# Patient Record
Sex: FEMALE | Race: WHITE | Employment: OTHER | URBAN - METROPOLITAN AREA
[De-identification: names, ages, dates, MRNs, and addresses within clinical notes are randomized per-mention and may not be internally consistent; named-entity substitution may affect disease eponyms.]

---

## 2019-09-05 ENCOUNTER — APPOINTMENT (OUTPATIENT)
Dept: GENERAL RADIOLOGY | Age: 84
End: 2019-09-05
Payer: MEDICARE

## 2019-09-05 ENCOUNTER — HOSPITAL ENCOUNTER (EMERGENCY)
Age: 84
Discharge: HOME | End: 2019-09-06
Attending: EMERGENCY MEDICINE
Payer: MEDICARE

## 2019-09-05 ENCOUNTER — APPOINTMENT (OUTPATIENT)
Dept: CT IMAGING | Age: 84
End: 2019-09-05
Payer: MEDICARE
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
## 2019-09-05 DIAGNOSIS — D72.829 LEUKOCYTOSIS, UNSPECIFIED TYPE: ICD-10-CM

## 2019-09-05 PROCEDURE — 71045 X-RAY EXAM CHEST 1 VIEW: CPT

## 2019-09-05 PROCEDURE — 70450 CT HEAD/BRAIN W/O DYE: CPT

## 2019-09-05 PROCEDURE — 99285 EMERGENCY DEPT VISIT HI MDM: CPT

## 2019-09-05 RX ORDER — RISPERIDONE 1 MG/1
1 TABLET, FILM COATED ORAL 2 TIMES DAILY PRN
Refills: 3 | COMMUNITY

## 2019-09-05 RX ORDER — SODIUM CHLORIDE 0.9 % (FLUSH) 0.9 %
10 SYRINGE (ML) INJECTION PRN
Status: DISCONTINUED | OUTPATIENT
Start: 2019-09-05 | End: 2019-09-06 | Stop reason: HOSPADM

## 2019-09-05 RX ORDER — TRIAMTERENE AND HYDROCHLOROTHIAZIDE 37.5; 25 MG/1; MG/1
1 TABLET ORAL DAILY PRN
COMMUNITY
End: 2020-03-24

## 2019-09-06 VITALS
HEART RATE: 64 BPM | SYSTOLIC BLOOD PRESSURE: 107 MMHG | DIASTOLIC BLOOD PRESSURE: 57 MMHG | HEIGHT: 61 IN | OXYGEN SATURATION: 95 % | RESPIRATION RATE: 16 BRPM | TEMPERATURE: 97.4 F | WEIGHT: 190 LBS | BODY MASS INDEX: 35.87 KG/M2

## 2019-09-06 LAB
ANION GAP SERPL CALCULATED.3IONS-SCNC: 11 MMOL/L (ref 7–16)
BACTERIA: NORMAL /HPF
BASOPHILS ABSOLUTE: 0.04 E9/L (ref 0–0.2)
BASOPHILS RELATIVE PERCENT: 0.3 % (ref 0–2)
BILIRUBIN URINE: NEGATIVE
BLOOD, URINE: NORMAL
BUN BLDV-MCNC: 17 MG/DL (ref 8–23)
CALCIUM SERPL-MCNC: 9 MG/DL (ref 8.6–10.2)
CHLORIDE BLD-SCNC: 98 MMOL/L (ref 98–107)
CLARITY: CLEAR
CO2: 27 MMOL/L (ref 22–29)
COLOR: YELLOW
CREAT SERPL-MCNC: 1 MG/DL (ref 0.5–1)
EKG ATRIAL RATE: 63 BPM
EKG P AXIS: 65 DEGREES
EKG P-R INTERVAL: 226 MS
EKG Q-T INTERVAL: 452 MS
EKG QRS DURATION: 88 MS
EKG QTC CALCULATION (BAZETT): 462 MS
EKG R AXIS: -5 DEGREES
EKG T AXIS: 72 DEGREES
EKG VENTRICULAR RATE: 63 BPM
EOSINOPHILS ABSOLUTE: 0.02 E9/L (ref 0.05–0.5)
EOSINOPHILS RELATIVE PERCENT: 0.1 % (ref 0–6)
EPITHELIAL CELLS, UA: NORMAL /HPF
GFR AFRICAN AMERICAN: >60
GFR NON-AFRICAN AMERICAN: 52 ML/MIN/1.73
GLUCOSE BLD-MCNC: 129 MG/DL (ref 74–99)
GLUCOSE URINE: NEGATIVE MG/DL
HCT VFR BLD CALC: 36.3 % (ref 34–48)
HEMOGLOBIN: 12 G/DL (ref 11.5–15.5)
IMMATURE GRANULOCYTES #: 0.08 E9/L
IMMATURE GRANULOCYTES %: 0.6 % (ref 0–5)
KETONES, URINE: NEGATIVE MG/DL
LEUKOCYTE ESTERASE, URINE: NEGATIVE
LYMPHOCYTES ABSOLUTE: 0.93 E9/L (ref 1.5–4)
LYMPHOCYTES RELATIVE PERCENT: 6.6 % (ref 20–42)
MCH RBC QN AUTO: 31.5 PG (ref 26–35)
MCHC RBC AUTO-ENTMCNC: 33.1 % (ref 32–34.5)
MCV RBC AUTO: 95.3 FL (ref 80–99.9)
MONOCYTES ABSOLUTE: 1.05 E9/L (ref 0.1–0.95)
MONOCYTES RELATIVE PERCENT: 7.5 % (ref 2–12)
NEUTROPHILS ABSOLUTE: 11.89 E9/L (ref 1.8–7.3)
NEUTROPHILS RELATIVE PERCENT: 84.9 % (ref 43–80)
NITRITE, URINE: NEGATIVE
PDW BLD-RTO: 12.7 FL (ref 11.5–15)
PH UA: 7 (ref 5–9)
PLATELET # BLD: 221 E9/L (ref 130–450)
PMV BLD AUTO: 10.1 FL (ref 7–12)
POTASSIUM SERPL-SCNC: 4 MMOL/L (ref 3.5–5)
PRO-BNP: 975 PG/ML (ref 0–450)
PROTEIN UA: NEGATIVE MG/DL
RBC # BLD: 3.81 E12/L (ref 3.5–5.5)
RBC UA: NORMAL /HPF (ref 0–2)
SODIUM BLD-SCNC: 136 MMOL/L (ref 132–146)
SPECIFIC GRAVITY UA: 1.01 (ref 1–1.03)
TROPONIN: <0.01 NG/ML (ref 0–0.03)
UROBILINOGEN, URINE: 0.2 E.U./DL
WBC # BLD: 14 E9/L (ref 4.5–11.5)
WBC UA: NORMAL /HPF (ref 0–5)

## 2019-09-06 PROCEDURE — 81001 URINALYSIS AUTO W/SCOPE: CPT

## 2019-09-06 PROCEDURE — 83880 ASSAY OF NATRIURETIC PEPTIDE: CPT

## 2019-09-06 PROCEDURE — 84484 ASSAY OF TROPONIN QUANT: CPT

## 2019-09-06 PROCEDURE — 87088 URINE BACTERIA CULTURE: CPT

## 2019-09-06 PROCEDURE — 93010 ELECTROCARDIOGRAM REPORT: CPT | Performed by: INTERNAL MEDICINE

## 2019-09-06 PROCEDURE — 93005 ELECTROCARDIOGRAM TRACING: CPT | Performed by: EMERGENCY MEDICINE

## 2019-09-06 PROCEDURE — 85025 COMPLETE CBC W/AUTO DIFF WBC: CPT

## 2019-09-06 PROCEDURE — 80048 BASIC METABOLIC PNL TOTAL CA: CPT

## 2019-09-08 LAB — URINE CULTURE, ROUTINE: NORMAL

## 2020-03-24 ENCOUNTER — APPOINTMENT (OUTPATIENT)
Dept: CT IMAGING | Age: 85
DRG: 175 | End: 2020-03-24
Payer: MEDICARE

## 2020-03-24 ENCOUNTER — APPOINTMENT (OUTPATIENT)
Dept: GENERAL RADIOLOGY | Age: 85
DRG: 175 | End: 2020-03-24
Payer: MEDICARE

## 2020-03-24 ENCOUNTER — APPOINTMENT (OUTPATIENT)
Dept: ULTRASOUND IMAGING | Age: 85
DRG: 175 | End: 2020-03-24
Payer: MEDICARE

## 2020-03-24 ENCOUNTER — HOSPITAL ENCOUNTER (INPATIENT)
Age: 85
LOS: 2 days | Discharge: HOME | DRG: 175 | End: 2020-03-26
Attending: EMERGENCY MEDICINE | Admitting: INTERNAL MEDICINE
Payer: MEDICARE

## 2020-03-24 PROBLEM — I26.99 PULMONARY EMBOLISM, BILATERAL (HCC): Status: ACTIVE | Noted: 2020-03-24

## 2020-03-24 PROBLEM — I65.21 RIGHT CAROTID ARTERY OCCLUSION: Status: ACTIVE | Noted: 2020-03-24

## 2020-03-24 PROBLEM — F03.90 DEMENTIA (HCC): Chronic | Status: ACTIVE | Noted: 2020-03-24

## 2020-03-24 PROBLEM — I65.22 LEFT CAROTID STENOSIS: Status: ACTIVE | Noted: 2020-03-24

## 2020-03-24 PROBLEM — I26.02 ACUTE SADDLE PULMONARY EMBOLISM WITH ACUTE COR PULMONALE (HCC): Status: ACTIVE | Noted: 2020-03-24

## 2020-03-24 PROBLEM — I10 HYPERTENSION: Chronic | Status: ACTIVE | Noted: 2020-03-24

## 2020-03-24 LAB
ABO/RH: NORMAL
ALBUMIN SERPL-MCNC: 3.7 G/DL (ref 3.5–5.2)
ALP BLD-CCNC: 77 U/L (ref 35–104)
ALT SERPL-CCNC: 16 U/L (ref 0–32)
ANION GAP SERPL CALCULATED.3IONS-SCNC: 16 MMOL/L (ref 7–16)
ANTIBODY SCREEN: NORMAL
APTT: 152.9 SEC (ref 24.5–35.1)
APTT: 27.1 SEC (ref 24.5–35.1)
APTT: >240 SEC (ref 24.5–35.1)
AST SERPL-CCNC: 28 U/L (ref 0–31)
BACTERIA: NORMAL /HPF
BASOPHILS ABSOLUTE: 0.04 E9/L (ref 0–0.2)
BASOPHILS RELATIVE PERCENT: 0.3 % (ref 0–2)
BILIRUB SERPL-MCNC: 0.4 MG/DL (ref 0–1.2)
BILIRUBIN URINE: NEGATIVE
BLOOD, URINE: ABNORMAL
BUN BLDV-MCNC: 18 MG/DL (ref 8–23)
CALCIUM SERPL-MCNC: 9.2 MG/DL (ref 8.6–10.2)
CHLORIDE BLD-SCNC: 97 MMOL/L (ref 98–107)
CHP ED QC CHECK: NORMAL
CLARITY: CLEAR
CO2: 26 MMOL/L (ref 22–29)
COLOR: YELLOW
CREAT SERPL-MCNC: 0.8 MG/DL (ref 0.5–1)
EKG ATRIAL RATE: 147 BPM
EKG Q-T INTERVAL: 378 MS
EKG QRS DURATION: 86 MS
EKG QTC CALCULATION (BAZETT): 509 MS
EKG R AXIS: 40 DEGREES
EKG T AXIS: 20 DEGREES
EKG VENTRICULAR RATE: 109 BPM
EOSINOPHILS ABSOLUTE: 0.26 E9/L (ref 0.05–0.5)
EOSINOPHILS RELATIVE PERCENT: 2.2 % (ref 0–6)
EPITHELIAL CELLS, UA: NORMAL /HPF
FIBRINOGEN: 508 MG/DL (ref 225–540)
GFR AFRICAN AMERICAN: >60
GFR NON-AFRICAN AMERICAN: >60 ML/MIN/1.73
GLUCOSE BLD-MCNC: 152 MG/DL
GLUCOSE BLD-MCNC: 164 MG/DL (ref 74–99)
GLUCOSE URINE: NEGATIVE MG/DL
HCT VFR BLD CALC: 42.9 % (ref 34–48)
HEMOGLOBIN: 13.9 G/DL (ref 11.5–15.5)
IMMATURE GRANULOCYTES #: 0.1 E9/L
IMMATURE GRANULOCYTES %: 0.8 % (ref 0–5)
INR BLD: 1
KETONES, URINE: NEGATIVE MG/DL
LACTIC ACID, SEPSIS: 2.7 MMOL/L (ref 0.5–1.9)
LACTIC ACID, SEPSIS: 3 MMOL/L (ref 0.5–1.9)
LACTIC ACID: 1.4 MMOL/L (ref 0.5–2.2)
LEUKOCYTE ESTERASE, URINE: ABNORMAL
LYMPHOCYTES ABSOLUTE: 2.76 E9/L (ref 1.5–4)
LYMPHOCYTES RELATIVE PERCENT: 23.1 % (ref 20–42)
MCH RBC QN AUTO: 31.2 PG (ref 26–35)
MCHC RBC AUTO-ENTMCNC: 32.4 % (ref 32–34.5)
MCV RBC AUTO: 96.2 FL (ref 80–99.9)
METER GLUCOSE: 152 MG/DL (ref 74–99)
MONOCYTES ABSOLUTE: 0.66 E9/L (ref 0.1–0.95)
MONOCYTES RELATIVE PERCENT: 5.5 % (ref 2–12)
NEUTROPHILS ABSOLUTE: 8.14 E9/L (ref 1.8–7.3)
NEUTROPHILS RELATIVE PERCENT: 68.1 % (ref 43–80)
NITRITE, URINE: NEGATIVE
PDW BLD-RTO: 12.5 FL (ref 11.5–15)
PH UA: 7.5 (ref 5–9)
PLATELET # BLD: 260 E9/L (ref 130–450)
PMV BLD AUTO: 9.6 FL (ref 7–12)
POTASSIUM SERPL-SCNC: 4 MMOL/L (ref 3.5–5)
PRO-BNP: 522 PG/ML (ref 0–450)
PROTEIN UA: NEGATIVE MG/DL
PROTHROMBIN TIME: 11.8 SEC (ref 9.3–12.4)
RBC # BLD: 4.46 E12/L (ref 3.5–5.5)
RBC UA: NORMAL /HPF (ref 0–2)
SODIUM BLD-SCNC: 139 MMOL/L (ref 132–146)
SPECIFIC GRAVITY UA: 1.01 (ref 1–1.03)
TOTAL PROTEIN: 6.9 G/DL (ref 6.4–8.3)
TROPONIN: 0.01 NG/ML (ref 0–0.03)
UROBILINOGEN, URINE: 0.2 E.U./DL
WBC # BLD: 12 E9/L (ref 4.5–11.5)
WBC UA: NORMAL /HPF (ref 0–5)

## 2020-03-24 PROCEDURE — 99223 1ST HOSP IP/OBS HIGH 75: CPT | Performed by: SURGERY

## 2020-03-24 PROCEDURE — 93308 TTE F-UP OR LMTD: CPT

## 2020-03-24 PROCEDURE — 2000000000 HC ICU R&B

## 2020-03-24 PROCEDURE — 2580000003 HC RX 258: Performed by: STUDENT IN AN ORGANIZED HEALTH CARE EDUCATION/TRAINING PROGRAM

## 2020-03-24 PROCEDURE — 6370000000 HC RX 637 (ALT 250 FOR IP): Performed by: NURSE PRACTITIONER

## 2020-03-24 PROCEDURE — 93010 ELECTROCARDIOGRAM REPORT: CPT | Performed by: INTERNAL MEDICINE

## 2020-03-24 PROCEDURE — 36415 COLL VENOUS BLD VENIPUNCTURE: CPT

## 2020-03-24 PROCEDURE — 93970 EXTREMITY STUDY: CPT

## 2020-03-24 PROCEDURE — 99221 1ST HOSP IP/OBS SF/LOW 40: CPT | Performed by: NURSE PRACTITIONER

## 2020-03-24 PROCEDURE — 85384 FIBRINOGEN ACTIVITY: CPT

## 2020-03-24 PROCEDURE — 2580000003 HC RX 258: Performed by: NURSE PRACTITIONER

## 2020-03-24 PROCEDURE — 84484 ASSAY OF TROPONIN QUANT: CPT

## 2020-03-24 PROCEDURE — 70450 CT HEAD/BRAIN W/O DYE: CPT

## 2020-03-24 PROCEDURE — 85025 COMPLETE CBC W/AUTO DIFF WBC: CPT

## 2020-03-24 PROCEDURE — 0042T CT BRAIN PERFUSION: CPT

## 2020-03-24 PROCEDURE — 81001 URINALYSIS AUTO W/SCOPE: CPT

## 2020-03-24 PROCEDURE — 87040 BLOOD CULTURE FOR BACTERIA: CPT

## 2020-03-24 PROCEDURE — 86900 BLOOD TYPING SEROLOGIC ABO: CPT

## 2020-03-24 PROCEDURE — 86901 BLOOD TYPING SEROLOGIC RH(D): CPT

## 2020-03-24 PROCEDURE — 83605 ASSAY OF LACTIC ACID: CPT

## 2020-03-24 PROCEDURE — 99285 EMERGENCY DEPT VISIT HI MDM: CPT

## 2020-03-24 PROCEDURE — 70498 CT ANGIOGRAPHY NECK: CPT

## 2020-03-24 PROCEDURE — 85610 PROTHROMBIN TIME: CPT

## 2020-03-24 PROCEDURE — 72125 CT NECK SPINE W/O DYE: CPT

## 2020-03-24 PROCEDURE — 85730 THROMBOPLASTIN TIME PARTIAL: CPT

## 2020-03-24 PROCEDURE — 93005 ELECTROCARDIOGRAM TRACING: CPT | Performed by: STUDENT IN AN ORGANIZED HEALTH CARE EDUCATION/TRAINING PROGRAM

## 2020-03-24 PROCEDURE — APPSS180 APP SPLIT SHARED TIME > 60 MINUTES: Performed by: NURSE PRACTITIONER

## 2020-03-24 PROCEDURE — 71275 CT ANGIOGRAPHY CHEST: CPT

## 2020-03-24 PROCEDURE — 71045 X-RAY EXAM CHEST 1 VIEW: CPT

## 2020-03-24 PROCEDURE — 73100 X-RAY EXAM OF WRIST: CPT

## 2020-03-24 PROCEDURE — 99291 CRITICAL CARE FIRST HOUR: CPT | Performed by: SURGERY

## 2020-03-24 PROCEDURE — 6370000000 HC RX 637 (ALT 250 FOR IP): Performed by: INTERNAL MEDICINE

## 2020-03-24 PROCEDURE — 86850 RBC ANTIBODY SCREEN: CPT

## 2020-03-24 PROCEDURE — 83880 ASSAY OF NATRIURETIC PEPTIDE: CPT

## 2020-03-24 PROCEDURE — 6360000004 HC RX CONTRAST MEDICATION: Performed by: RADIOLOGY

## 2020-03-24 PROCEDURE — 6360000002 HC RX W HCPCS: Performed by: STUDENT IN AN ORGANIZED HEALTH CARE EDUCATION/TRAINING PROGRAM

## 2020-03-24 PROCEDURE — 70496 CT ANGIOGRAPHY HEAD: CPT

## 2020-03-24 PROCEDURE — 80053 COMPREHEN METABOLIC PANEL: CPT

## 2020-03-24 PROCEDURE — 82962 GLUCOSE BLOOD TEST: CPT

## 2020-03-24 RX ORDER — ACETAMINOPHEN 650 MG/1
650 SUPPOSITORY RECTAL EVERY 6 HOURS PRN
Status: DISCONTINUED | OUTPATIENT
Start: 2020-03-24 | End: 2020-03-26 | Stop reason: HOSPADM

## 2020-03-24 RX ORDER — SODIUM CHLORIDE 0.9 % (FLUSH) 0.9 %
10 SYRINGE (ML) INJECTION EVERY 12 HOURS SCHEDULED
Status: DISCONTINUED | OUTPATIENT
Start: 2020-03-24 | End: 2020-03-24 | Stop reason: SDUPTHER

## 2020-03-24 RX ORDER — HEPARIN SODIUM 1000 [USP'U]/ML
40 INJECTION, SOLUTION INTRAVENOUS; SUBCUTANEOUS PRN
Status: DISCONTINUED | OUTPATIENT
Start: 2020-03-24 | End: 2020-03-25

## 2020-03-24 RX ORDER — 0.9 % SODIUM CHLORIDE 0.9 %
1000 INTRAVENOUS SOLUTION INTRAVENOUS ONCE
Status: COMPLETED | OUTPATIENT
Start: 2020-03-24 | End: 2020-03-24

## 2020-03-24 RX ORDER — HEPARIN SODIUM 1000 [USP'U]/ML
80 INJECTION, SOLUTION INTRAVENOUS; SUBCUTANEOUS ONCE
Status: DISCONTINUED | OUTPATIENT
Start: 2020-03-24 | End: 2020-03-25

## 2020-03-24 RX ORDER — PROMETHAZINE HYDROCHLORIDE 25 MG/1
12.5 TABLET ORAL EVERY 6 HOURS PRN
Status: DISCONTINUED | OUTPATIENT
Start: 2020-03-24 | End: 2020-03-26 | Stop reason: HOSPADM

## 2020-03-24 RX ORDER — ACETAMINOPHEN 325 MG/1
650 TABLET ORAL EVERY 6 HOURS PRN
Status: DISCONTINUED | OUTPATIENT
Start: 2020-03-24 | End: 2020-03-26 | Stop reason: HOSPADM

## 2020-03-24 RX ORDER — METOPROLOL TARTRATE 50 MG/1
50 TABLET, FILM COATED ORAL 2 TIMES DAILY
Status: DISCONTINUED | OUTPATIENT
Start: 2020-03-24 | End: 2020-03-26 | Stop reason: HOSPADM

## 2020-03-24 RX ORDER — HEPARIN SODIUM 10000 [USP'U]/100ML
18 INJECTION, SOLUTION INTRAVENOUS CONTINUOUS
Status: DISCONTINUED | OUTPATIENT
Start: 2020-03-24 | End: 2020-03-25

## 2020-03-24 RX ORDER — ONDANSETRON 2 MG/ML
4 INJECTION INTRAMUSCULAR; INTRAVENOUS EVERY 6 HOURS PRN
Status: DISCONTINUED | OUTPATIENT
Start: 2020-03-24 | End: 2020-03-25

## 2020-03-24 RX ORDER — ATORVASTATIN CALCIUM 40 MG/1
40 TABLET, FILM COATED ORAL DAILY
Status: DISCONTINUED | OUTPATIENT
Start: 2020-03-24 | End: 2020-03-26 | Stop reason: HOSPADM

## 2020-03-24 RX ORDER — RISPERIDONE 1 MG/1
1 TABLET, FILM COATED ORAL 2 TIMES DAILY PRN
Status: DISCONTINUED | OUTPATIENT
Start: 2020-03-24 | End: 2020-03-24

## 2020-03-24 RX ORDER — ASPIRIN 81 MG/1
81 TABLET, CHEWABLE ORAL DAILY
Status: DISCONTINUED | OUTPATIENT
Start: 2020-03-24 | End: 2020-03-24

## 2020-03-24 RX ORDER — METOPROLOL TARTRATE 50 MG/1
50 TABLET, FILM COATED ORAL 2 TIMES DAILY
COMMUNITY

## 2020-03-24 RX ORDER — HEPARIN SODIUM 1000 [USP'U]/ML
80 INJECTION, SOLUTION INTRAVENOUS; SUBCUTANEOUS PRN
Status: DISCONTINUED | OUTPATIENT
Start: 2020-03-24 | End: 2020-03-25

## 2020-03-24 RX ORDER — TRIAMTERENE AND HYDROCHLOROTHIAZIDE 37.5; 25 MG/1; MG/1
1 TABLET ORAL DAILY PRN
COMMUNITY
End: 2020-04-02

## 2020-03-24 RX ORDER — POLYETHYLENE GLYCOL 3350 17 G/17G
17 POWDER, FOR SOLUTION ORAL DAILY PRN
Status: DISCONTINUED | OUTPATIENT
Start: 2020-03-24 | End: 2020-03-26 | Stop reason: HOSPADM

## 2020-03-24 RX ORDER — SODIUM CHLORIDE 9 MG/ML
INJECTION, SOLUTION INTRAVENOUS CONTINUOUS
Status: DISCONTINUED | OUTPATIENT
Start: 2020-03-24 | End: 2020-03-25

## 2020-03-24 RX ORDER — SODIUM CHLORIDE 0.9 % (FLUSH) 0.9 %
10 SYRINGE (ML) INJECTION EVERY 12 HOURS SCHEDULED
Status: DISCONTINUED | OUTPATIENT
Start: 2020-03-24 | End: 2020-03-26 | Stop reason: HOSPADM

## 2020-03-24 RX ORDER — VERAPAMIL HYDROCHLORIDE 240 MG/1
240 TABLET, FILM COATED, EXTENDED RELEASE ORAL NIGHTLY
Status: DISCONTINUED | OUTPATIENT
Start: 2020-03-24 | End: 2020-03-26 | Stop reason: HOSPADM

## 2020-03-24 RX ORDER — SODIUM CHLORIDE 0.9 % (FLUSH) 0.9 %
10 SYRINGE (ML) INJECTION PRN
Status: DISCONTINUED | OUTPATIENT
Start: 2020-03-24 | End: 2020-03-26 | Stop reason: HOSPADM

## 2020-03-24 RX ORDER — CHLORHEXIDINE GLUCONATE 0.12 MG/ML
5 RINSE ORAL SEE ADMIN INSTRUCTIONS
COMMUNITY
End: 2020-03-24

## 2020-03-24 RX ORDER — SODIUM CHLORIDE 0.9 % (FLUSH) 0.9 %
10 SYRINGE (ML) INJECTION PRN
Status: DISCONTINUED | OUTPATIENT
Start: 2020-03-24 | End: 2020-03-24 | Stop reason: SDUPTHER

## 2020-03-24 RX ORDER — RISPERIDONE 0.5 MG/1
0.5 TABLET, FILM COATED ORAL 2 TIMES DAILY
Status: DISCONTINUED | OUTPATIENT
Start: 2020-03-24 | End: 2020-03-26 | Stop reason: HOSPADM

## 2020-03-24 RX ADMIN — Medication 10 ML: at 03:56

## 2020-03-24 RX ADMIN — HEPARIN SODIUM 18 UNITS/KG/HR: 10000 INJECTION, SOLUTION INTRAVENOUS at 07:58

## 2020-03-24 RX ADMIN — SODIUM CHLORIDE 1000 ML: 9 INJECTION, SOLUTION INTRAVENOUS at 07:51

## 2020-03-24 RX ADMIN — HEPARIN SODIUM 6900 UNITS: 1000 INJECTION, SOLUTION INTRAVENOUS; SUBCUTANEOUS at 07:57

## 2020-03-24 RX ADMIN — METOPROLOL TARTRATE 50 MG: 50 TABLET, FILM COATED ORAL at 12:50

## 2020-03-24 RX ADMIN — SODIUM CHLORIDE: 9 INJECTION, SOLUTION INTRAVENOUS at 12:51

## 2020-03-24 RX ADMIN — VERAPAMIL HYDROCHLORIDE 240 MG: 240 TABLET, FILM COATED, EXTENDED RELEASE ORAL at 20:42

## 2020-03-24 RX ADMIN — IOPAMIDOL 60 ML: 755 INJECTION, SOLUTION INTRAVENOUS at 07:09

## 2020-03-24 RX ADMIN — METOPROLOL TARTRATE 50 MG: 50 TABLET, FILM COATED ORAL at 20:45

## 2020-03-24 RX ADMIN — RISPERIDONE 0.5 MG: 1 TABLET, FILM COATED ORAL at 20:42

## 2020-03-24 RX ADMIN — IOPAMIDOL 100 ML: 755 INJECTION, SOLUTION INTRAVENOUS at 03:56

## 2020-03-24 RX ADMIN — SODIUM CHLORIDE, PRESERVATIVE FREE 10 ML: 5 INJECTION INTRAVENOUS at 20:40

## 2020-03-24 ASSESSMENT — PAIN SCALES - GENERAL
PAINLEVEL_OUTOF10: 0

## 2020-03-24 NOTE — CONSULTS
[place]  Department of Internal Medicine  Division of Pulmonary, Critical Care and Sleep Medicine  Consult Note    Kalyn Christiansen DO, María Andrea MD, [facility]    Patient: Erin Acharya  MRN: [id number]  : 10/26/1931    Encounter Time: 9:49 AM     Date of Admission: 3/24/2020  2:19 AM    Primary Care Physician: Maggi Parham MD    Reason for Consultation: Acute Pulmonary Embolism      HISTORY OF PRESENT ILLNESS : Erin Acharya 80 y.o. female was seen in consultation regarding the above chief compliant. Nika Menjivar is a 24-year-old female with a history of dementia the presents to the emergency department for evaluation after a fall. Patient was at home with daughter when the daughter heard a loud thud upstairs and found the patient on the floor with agonal breathing and unresponsive. When EMS got there she was hypoxic with a GCS of 9. They were able to bag her with improvement of her oxygenation. Last known well was at 9:30pm the previous this evening. It is constant and severe. Patient's daughter does state that the patient has had previous episodes of confusion with her dementia. Denies any recent cough, fever, chills, nausea, vomiting or other episodes of shortness of breath. CT of the head neck showed no signs of intracranial hemorrhage or acute fracture. Concern for possible stroke due to her confusion however CTA of the head and neck as well as CT perfusion showed no large vessel occlusion. Patient returned to baseline mental status and oxygenation improved to 93 to 94% on room air. Hospitalist requested CTA of the chest and ABG which were both obtained and revealed Large saddle pulmonary embolism involving the right and left pulmonary artery extending to upper and lower lobe branches. There is an element of cardiac strain.  Cardiomegaly with coronary artery calcification, minimal atelectasis in lung bases superimposed on pulmonary fibrosis. Given the finding pulmonary,vascular and ICU care was requested. PAST MEDICAL HISTORY:  has a past medical history of Hyperlipidemia and Hypertension. SURGICAL HISTORY:  has a past surgical history that includes Hysterectomy. SOCIAL HISTORY:  reports that she has never smoked. She has never used smokeless tobacco. She reports that she does not drink alcohol or use drugs. FAMILY  HISTORY: family history is not on file. MEDICATIONS:    Prior to Admission medications    Medication Sig Start Date End Date Taking? Authorizing Provider   metoprolol tartrate (LOPRESSOR) 50 MG tablet Take 50 mg by mouth 2 times daily   Yes Historical Provider, MD   chlorhexidine (PERIDEX) 0.12 % solution Take 5 mLs by mouth See Admin Instructions Dilute 5 ml to 10 ml of water for 60 seconds and expectorate once a day   Yes Historical Provider, MD   risperiDONE (RISPERDAL) 1 MG tablet Take 1 mg by mouth 2 times daily as needed  7/27/19  Yes Historical Provider, MD   Multiple Vitamin (MULTI-VITAMIN DAILY) TABS Take 1 tablet by mouth daily    Yes Historical Provider, MD   calcium carbonate (OSCAL) 500 MG TABS tablet Take 500 mg by mouth daily   Yes Historical Provider, MD   aspirin 81 MG tablet Take 81 mg by mouth daily. Yes Historical Provider, MD   verapamil (CALAN SR) 240 MG CR tablet Take 240 mg by mouth nightly. Yes Historical Provider, MD       ALLERGIES: Patient has no known allergies. REVIEW OF SYSTEMS:  Otherwise negative if not reported or listed below,   Constitutional:  No unanticipated weight loss. No change in sleep pattern or activity. No fevers, chills or rigors. Eyes:    No visual changes or diplopia. No scleral icterus. ENT:    No Headaches, hearing loss or vertigo. No mouth sores or sore throat. Cardiovascular:  + chest discomfort, palpitations. Respiratory:  + cough, No wheezing      No sputum production.       No hemoptysis, pleuritic 4. Extensive calcified thrombus formation involves the right carotid bulb located in the retropharyngeal location. Moderately extensive atherosclerotic vascular calcification involves the proximal left ICA. Refer to CTA neck written report for additional information from same date. 5. Additional nonacute findings as described above. This report has been electronically signed by Gilbert Lombardi MD.    Cta Chest W Contrast  FINDINGS: There is borderline cardiomegaly with  coronary artery calcification. The great vessels are unremarkable. A large filling defect is identified in the main pulmonary artery extending to the right and left main branches, and the upper and lower lobe segmental branches consistent with a large saddle pulmonary embolism. There is an element of cardiac strain with  septal deviation. The trachea and major bronchi are patent. There is suggestion of pulmonary fibrosis with subpleural septal thickening and some atelectasis in the lung bases. There is no focal consolidation or pleural effusion. 2 mm nonspecific nodule is present in the left lower lobe. The liver is of normal architecture. There is atherosclerotic plaque in the descending thoracic aorta. Degenerative changes are identified in the thoracic spine. Large saddle pulmonary embolism involving the right and left pulmonary artery extending to upper and lower lobe branches. There is an element of cardiac strain. Cardiomegaly with coronary artery calcification, minimal atelectasis in lung bases superimposed on pulmonary fibrosis. The above findings were telephoned to Dr. Merrill Cotter at 7:33 AM on 3/24/2020. ALERT:  THIS IS AN ABNORMAL REPORT         ECHOCARDIOGRAM:   Ejection fraction is visually estimated at 65%.   No regional wall motion abnormalities seen.   Moderate left ventricular concentric hypertrophy noted.  Wandy [place] dilated right ventricle with mildly reduced function.  TAPSE is 1.6   cm.   Physiologic and/or trace mitral regurgitation is present.   Mild tricuspid regurgitation.   Pulmonary hypertension is mild. RVSP is 45 mmHg. Assessment: 80year old with dementia found unresponsive or agonal with finding of a large pulmonary artery with advanced carotid disease. 1. Massive pulmonary embolism   2. Dementia  3. CAD  4. PVD  5. Carotid Occusive disease          Plan:   1. High dose heparin gtt -   2. Vascular consult   3. Stat echocardiogram for RV function  4. Monitor BP   5. Long term anticoagulation decision based on further information and discussion with daughter.        Leyda Ocasio, MPH, [facility]  Professor of Internal Medicine  Pulmonary, Critical Care and Sleep Medicine

## 2020-03-24 NOTE — ED NOTES
Bed: 05  Expected date:   Expected time:   Means of arrival:   Comments:  EMS     Carlitos Johnson RN  03/24/20 0951

## 2020-03-24 NOTE — CONSULTS
Chief Complaint: Patient seen for evaluation of right carotid artery occlusion, pulmonary embolus      HPI: This patient, was hospitalized, with history of confusion, and fall, this morning early, with some shortness of breath, came to the emergency room, underwent extensive work-up including a CTA of the carotids revealed evidence of bilateral pulmonary emboli, with some saddle component, potential right ventricular dilatation and vascular service was consulted    When the EMR people saw the patient, patient also was found to be hypoxic    At the time I was instructed, discussed with the ER physician Dr. Colette Chavez, instructed him to get a stat 2D echo, troponin level, proBNP and bilateral venous ultrasound study of the legs along with fibrinogen level for potential catheter directed thrombolytic therapy if necessary based upon the 2D echo findings    When I came to see the patient, patient's daughter Joyce Gonzalez was in the room, patient denies any previous history of stroke, denies any past history of deep vein thrombosis, no family history strongly suggestive of pulmonary embolus etc.      Patient denies any focal lateralizing neurological symptoms like loss of speech, vision or loss of function of extremity    Patient can walks short distances around the house  and denies any symptoms of rest pain    No Known Allergies    Current Facility-Administered Medications   Medication Dose Route Frequency Provider Last Rate Last Dose    sodium chloride flush 0.9 % injection 10 mL  10 mL Intravenous 2 times per day Audra Cobia., DO        sodium chloride flush 0.9 % injection 10 mL  10 mL Intravenous PRN Audra Cobia., DO   10 mL at 03/24/20 0356    heparin (porcine) injection 6,900 Units  80 Units/kg Intravenous Once Audra Cobia., DO        heparin (porcine) injection 6,900 Units  80 Units/kg Intravenous PRN Audra Cobia., DO   6,900 Units at 03/24/20 0757    heparin (porcine) injection 3,450 Units distress. ,  Slightly confused skin:  Warm and dry. Head:  Normocephalic. No masses, lesions or tenderness. Eyes:  Conjunctivae appear normal; PERRL. Ears:  External ears normal.  Nose/Sinuses:  Septum midline, mucosa normal; no drainage. Oropharynx:  Clear, no exudate noted. Neck:  No jugular venous distention, lymphadenopathy or thyromegaly. No evidence of carotid bruit      Lungs:  Clear to ausculation bilaterally. No rhonchi, crackles, wheezes. Heart:  Regular rate and rhythm. No rub or murmur. .    Abdomen:  Soft, non-tender. No masses, organomegaly. Musculoskeletal: No joint effusions, tenderness swelling or warmth. Neuro: Speech is intact. Moving all extremities. No focal motor or sensory deficits. Extremities:  Both feet are warm to touch. The color of both feet is normal.    No leg swelling noted      Pulses Right  Left    Brachial 3 3    Radial    3=normal   Femoral 2 2  2=diminished   Popliteal    1=barely palpable   Dorsalis pedis    0=absent   Posterior tibial    4=aneurysmal           Other pertinent information:1. The past medical records were reviewed. 2.    Lab Results   Component Value Date    WBC 12.0 (H) 03/24/2020    HGB 13.9 03/24/2020    HCT 42.9 03/24/2020    MCV 96.2 03/24/2020     03/24/2020      Lab Results   Component Value Date     03/24/2020    K 4.0 03/24/2020    CL 97 (L) 03/24/2020    CO2 26 03/24/2020    BUN 18 03/24/2020    CREATININE 0.8 03/24/2020    GLUCOSE 152 03/24/2020    CALCIUM 9.2 03/24/2020    PROT 6.9 03/24/2020    LABALBU 3.7 03/24/2020    BILITOT 0.4 03/24/2020    ALKPHOS 77 03/24/2020    AST 28 03/24/2020    ALT 16 03/24/2020    LABGLOM >60 03/24/2020    GFRAA >60 03/24/2020     Lab Results   Component Value Date    APTT 27.1 03/24/2020      Lab Results   Component Value Date    INR 1.0 03/24/2020    INR 1.0 01/16/2014    PROTIME 11.8 03/24/2020    PROTIME 12.2  01/16/2014        3.    XR WRIST RIGHT (2 VIEWS)   Final reduced ventricle function, right ventricular systolic pressure 45 mmHg, oxygen saturation room air 94%        Plan:       I had a long detailed discussion with the patient and her daughter for reading in the room telephone #[phone], all options, risks benefits and alternatives explained    #1 from a carotid artery point of view patient has chronic atherosclerotic occlusion, follow conservatively for now with close monitoring of carotid ultrasound at least once a year to evaluate left carotid artery and to call me immediately if any  neurological symptoms, explained eventually may consider adding a low-dose baby aspirin if possible the patient is tolerating anticoagulation well    #2 as far as the pulmonary embolus is concerned, as the 2D echo revealed mildly reduced right ventricle function with right ventricular systolic pressure of 40 mmHg, for now follow conservatively with anticoagulation with IV heparin, may consider to Eliquis tomorrow if patient is stable otherwise and if okay with all the consultants on the case    Will obtain a venous ultrasound of both lower extremities to evaluate the source and recommend up-to-date colonoscopy and GYN evaluation along with mammogram if not done recently    No need for the patient vena cava filter at the present time    There are also recommended a venous ultrasound study of both lower extremities to evaluate the source of pulmonary embolus    All their questions were answered    I have also discussed with the ER physician Dr. Jean-Pierre Barrett      Thank you for letting me participate in the care of your patient            Electronically signed by Tez Staley MD on 3/24/2020 at 12:51 PM

## 2020-03-24 NOTE — CONSULTS
Palliative Care Department  Palliative Care Initial Consult  Provider: Ari Lomeli Day: 1    Referring Provider:  Fernando Hoffman DO    Reason for Consult:  []  Code status Discussion  [x]  Assist with goals of care  []  Psychosocial support  []  Symptom Management  []  Advanced Care Planning    Chief Complaint: Eder Chang is a 80 y.o. female with chief complaint of fall. Active Hospital Problems    Diagnosis Date Noted    Pulmonary embolism, bilateral ([facility] [address] [I26.99] 03/24/2020    Dementia ([address] [F03.90] 03/24/2020    Hypertension [I10] 03/24/2020    Acute saddle pulmonary embolism with acute cor pulmonale ([facility]CC) [I26.02] 03/24/2020           Palliative Care Encounter/Recommendations:      - Goals of care: continue current management and to be determined     - Code Status: full code      Subjective:     HPI:  Eder Chang is a 80 y.o. female with significant past medical history of dementia, HTN and HLD who presented with fall. Per chart review, patient was at home with daughter when daughter heard a loud thud upstairs and found the patient on the floor agonal breathing and unresponsive. When EMS arrived, patient was hypoxic and were able to bag her with improved oxygenation. CTA chest showed  large saddle pulmonary embolism involving the right and left pulmonary artery extending to upper and lower lobe branches. CTA neck showed occlusion of the right internal carotid artery, right external carotid artery is occluded, approximate 60% stenosis origin left ICA. CT head showed hyperdense right middle cerebral artery, acute/subacute thrombosis involving right MCA cannot be excluded. Patient admitted to CVICU for further management. Subjective/Events/Discussions:  Patient seen, no family present at bedside. Patient alert however disoriented and confused. Called patient's daughter, Nohemy. Nohemy explains that she is patient's HCPOA.   Plain role of palliative

## 2020-03-24 NOTE — ED PROVIDER NOTES
Patient is an 40-year-old female with a history of dementia the presents to the emergency department for evaluation after a fall. Patient was at home with daughter when the daughter heard a loud thud upstairs and found the patient on the floor with agonal breathing and unresponsive. When EMS got there she was hypoxic with a GCS of 9. They were able to bag her with improvement of her oxygenation. Last known well was at 9:30pm the previous this evening. It is constant and severe. Patient's daughter does state that the patient has had previous episodes of confusion with her dementia. Denies any recent cough, fever, chills, nausea, vomiting or other episodes of shortness of breath. Patient is altered and unable to provide further history. The history is provided by the EMS personnel and a relative. Altered Mental Status   Presenting symptoms: unresponsiveness    Severity:  Severe  Most recent episode: Today  Episode history:  Continuous  Timing:  Constant  Progression:  Improving  Chronicity:  New  Context: dementia         Review of Systems   Unable to perform ROS: Mental status change        Physical Exam  Vitals signs and nursing note reviewed. Constitutional:       General: She is in acute distress. Appearance: She is well-developed. She is obese. She is ill-appearing. HENT:      Head: Normocephalic and atraumatic. Mouth/Throat:      Mouth: Mucous membranes are moist.   Eyes:      Extraocular Movements: Extraocular movements intact. Conjunctiva/sclera: Conjunctivae normal.      Pupils: Pupils are equal, round, and reactive to light. Neck:      Musculoskeletal: Normal range of motion and neck supple. Cardiovascular:      Rate and Rhythm: Regular rhythm. Tachycardia present. Heart sounds: Normal heart sounds. No murmur. Pulmonary:      Effort: Pulmonary effort is normal.      Breath sounds: Normal breath sounds. No wheezing or rales.    Abdominal:      General: Bowel sounds are normal.      Palpations: Abdomen is soft. Tenderness: There is no abdominal tenderness. There is no guarding or rebound. Musculoskeletal:         General: No swelling or tenderness. Skin:     General: Skin is warm and dry. Neurological:      Mental Status: She is lethargic. GCS: GCS eye subscore is 3. GCS verbal subscore is 3. GCS motor subscore is 5. Psychiatric:         Behavior: Behavior is uncooperative. Procedures     MDM  Number of Diagnoses or Management Options  Altered mental status, unspecified altered mental status type:   Fall, initial encounter:   Hypoxia:   Diagnosis management comments: Patient is an 49-year-old female who presents emergency department for evaluation after a fall. Blood work unremarkable. CT of the head neck showed no signs of intracranial hemorrhage or acute fracture. Concern for possible stroke due to her confusion however CTA of the head and neck as well as CT perfusion showed no large vessel occlusion. Patient returned to baseline mental status and oxygenation improved to 93 to 94% on room air. Concern for altered mental status and hypoxia decision made to admit the patient. Hospitalist requested CTA of the chest and ABG which were both obtained and are pending at this time. Plan to admit once results are finalized. ED Course as of Mar 24 1147   Tue Mar 24, 2020   0314 Comprehensive Metabolic Panel(!):    Sodium 139   Potassium 4.0   Chloride 97(!)   CO2 26   Anion Gap 16   Glucose 164(!)   BUN 18   Creatinine 0.8   GFR Non- >60   GFR African American >60   Calcium 9.2   Total Protein 6.9   Albumin 3.7   Bilirubin 0.4   Alk Phos 77   ALT 16   AST 28 [CB]      ED Course User Index  [CB] Louann Adjutant, DO       --------------------------------------------- PAST HISTORY ---------------------------------------------  Past Medical History:  has a past medical history of Hyperlipidemia and Hypertension.     Past Surgical History:  has a past surgical history that includes Hysterectomy. Social History:  reports that she has never smoked. She has never used smokeless tobacco. She reports that she does not drink alcohol or use drugs. Family History: family history is not on file. The patients home medications have been reviewed. Allergies: Patient has no known allergies.     -------------------------------------------------- RESULTS -------------------------------------------------    LABS:  Results for orders placed or performed during the hospital encounter of 03/24/20   CBC Auto Differential   Result Value Ref Range    WBC 12.0 (H) 4.5 - 11.5 E9/L    RBC 4.46 3.50 - 5.50 E12/L    Hemoglobin 13.9 11.5 - 15.5 g/dL    Hematocrit 42.9 34.0 - 48.0 %    MCV 96.2 80.0 - 99.9 fL    MCH 31.2 26.0 - 35.0 pg    MCHC 32.4 32.0 - 34.5 %    RDW 12.5 11.5 - 15.0 fL    Platelets 866 817 - 043 E9/L    MPV 9.6 7.0 - 12.0 fL    Neutrophils % 68.1 43.0 - 80.0 %    Immature Granulocytes % 0.8 0.0 - 5.0 %    Lymphocytes % 23.1 20.0 - 42.0 %    Monocytes % 5.5 2.0 - 12.0 %    Eosinophils % 2.2 0.0 - 6.0 %    Basophils % 0.3 0.0 - 2.0 %    Neutrophils Absolute 8.14 (H) 1.80 - 7.30 E9/L    Immature Granulocytes # 0.10 E9/L    Lymphocytes Absolute 2.76 1.50 - 4.00 E9/L    Monocytes Absolute 0.66 0.10 - 0.95 E9/L    Eosinophils Absolute 0.26 0.05 - 0.50 E9/L    Basophils Absolute 0.04 0.00 - 0.20 E9/L   Comprehensive Metabolic Panel   Result Value Ref Range    Sodium 139 132 - 146 mmol/L    Potassium 4.0 3.5 - 5.0 mmol/L    Chloride 97 (L) 98 - 107 mmol/L    CO2 26 22 - 29 mmol/L    Anion Gap 16 7 - 16 mmol/L    Glucose 164 (H) 74 - 99 mg/dL    BUN 18 8 - 23 mg/dL    CREATININE 0.8 0.5 - 1.0 mg/dL    GFR Non-African American >60 >=60 mL/min/1.73    GFR African American >60     Calcium 9.2 8.6 - 10.2 mg/dL    Total Protein 6.9 6.4 - 8.3 g/dL    Alb 3.7 3.5 - 5.2 g/dL    Total Bilirubin 0.4 0.0 - 1.2 mg/dL    Alkaline Phosphatase 77 35 - 104 U/L ------------------------------------------  Re-evaluation(s):  Time: 0530  Patients symptoms are improving  Repeat physical examination is improved    Counseling:  I have spoken with the patient and daughter and discussed todays results, in addition to providing specific details for the plan of care and counseling regarding the diagnosis and prognosis. Their questions are answered at this time and they are agreeable with the plan of admission.    --------------------------------- ADDITIONAL PROVIDER NOTES ---------------------------------  Consultations:  Spoke with Dr. Leroy Pinzon. Discussed case. They will admit the patient. Spoke with Dr. Cathy Oliveira (Critical care). Discussed case. They will provide consultation. This patient's ED course included: a personal history and physicial examination, re-evaluation prior to disposition, multiple bedside re-evaluations, IV medications, cardiac monitoring, continuous pulse oximetry and complex medical decision making and emergency management     Patient was found to have pulmonary embolism. It does show right heart strain on echo. Contacted admitting physician and critical care physician and they both accepted further management. Patient was started on heparin emergency department. Patient agreed with this plan. This patient has remained hemodynamically stable during their ED course. Diagnosis:  1. Fall, initial encounter    2. Altered mental status, unspecified altered mental status type    3. Hypoxia    4. Bilateral pulmonary embolism ([address]    5. Acute pulmonary embolism without acute cor pulmonale, unspecified pulmonary embolism type ([address]    6. Congestive heart failure, NYHA class 2, unspecified congestive heart failure type ([address]        Disposition:  Patient's disposition: Admit to telemetry  Patient's condition is stable.          Laureano Ayers DO  Resident  03/24/20 1004

## 2020-03-25 LAB
ANION GAP SERPL CALCULATED.3IONS-SCNC: 10 MMOL/L (ref 7–16)
APTT: 104.9 SEC (ref 24.5–35.1)
APTT: 111.7 SEC (ref 24.5–35.1)
BUN BLDV-MCNC: 10 MG/DL (ref 8–23)
CALCIUM IONIZED: 1.21 MMOL/L (ref 1.15–1.33)
CALCIUM SERPL-MCNC: 8.4 MG/DL (ref 8.6–10.2)
CHLORIDE BLD-SCNC: 103 MMOL/L (ref 98–107)
CO2: 27 MMOL/L (ref 22–29)
CREAT SERPL-MCNC: 0.7 MG/DL (ref 0.5–1)
GFR AFRICAN AMERICAN: >60
GFR NON-AFRICAN AMERICAN: >60 ML/MIN/1.73
GLUCOSE BLD-MCNC: 96 MG/DL (ref 74–99)
HCT VFR BLD CALC: 34.6 % (ref 34–48)
HEMOGLOBIN: 11 G/DL (ref 11.5–15.5)
LACTIC ACID: 1.1 MMOL/L (ref 0.5–2.2)
MAGNESIUM: 2.1 MG/DL (ref 1.6–2.6)
MCH RBC QN AUTO: 30.6 PG (ref 26–35)
MCHC RBC AUTO-ENTMCNC: 31.8 % (ref 32–34.5)
MCV RBC AUTO: 96.1 FL (ref 80–99.9)
PDW BLD-RTO: 13 FL (ref 11.5–15)
PHOSPHORUS: 3.5 MG/DL (ref 2.5–4.5)
PLATELET # BLD: 206 E9/L (ref 130–450)
PMV BLD AUTO: 10 FL (ref 7–12)
POTASSIUM SERPL-SCNC: 3.5 MMOL/L (ref 3.5–5)
RBC # BLD: 3.6 E12/L (ref 3.5–5.5)
SODIUM BLD-SCNC: 140 MMOL/L (ref 132–146)
WBC # BLD: 7.4 E9/L (ref 4.5–11.5)

## 2020-03-25 PROCEDURE — 99233 SBSQ HOSP IP/OBS HIGH 50: CPT | Performed by: INTERNAL MEDICINE

## 2020-03-25 PROCEDURE — 97166 OT EVAL MOD COMPLEX 45 MIN: CPT

## 2020-03-25 PROCEDURE — 2580000003 HC RX 258: Performed by: STUDENT IN AN ORGANIZED HEALTH CARE EDUCATION/TRAINING PROGRAM

## 2020-03-25 PROCEDURE — 6370000000 HC RX 637 (ALT 250 FOR IP): Performed by: NURSE PRACTITIONER

## 2020-03-25 PROCEDURE — 97162 PT EVAL MOD COMPLEX 30 MIN: CPT

## 2020-03-25 PROCEDURE — 83735 ASSAY OF MAGNESIUM: CPT

## 2020-03-25 PROCEDURE — 2140000000 HC CCU INTERMEDIATE R&B

## 2020-03-25 PROCEDURE — 84100 ASSAY OF PHOSPHORUS: CPT

## 2020-03-25 PROCEDURE — 97530 THERAPEUTIC ACTIVITIES: CPT

## 2020-03-25 PROCEDURE — 6370000000 HC RX 637 (ALT 250 FOR IP): Performed by: INTERNAL MEDICINE

## 2020-03-25 PROCEDURE — 99232 SBSQ HOSP IP/OBS MODERATE 35: CPT | Performed by: SURGERY

## 2020-03-25 PROCEDURE — 83605 ASSAY OF LACTIC ACID: CPT

## 2020-03-25 PROCEDURE — 2700000000 HC OXYGEN THERAPY PER DAY

## 2020-03-25 PROCEDURE — 85730 THROMBOPLASTIN TIME PARTIAL: CPT

## 2020-03-25 PROCEDURE — 82330 ASSAY OF CALCIUM: CPT

## 2020-03-25 PROCEDURE — 6360000002 HC RX W HCPCS: Performed by: STUDENT IN AN ORGANIZED HEALTH CARE EDUCATION/TRAINING PROGRAM

## 2020-03-25 PROCEDURE — 80048 BASIC METABOLIC PNL TOTAL CA: CPT

## 2020-03-25 PROCEDURE — 36415 COLL VENOUS BLD VENIPUNCTURE: CPT

## 2020-03-25 PROCEDURE — 85027 COMPLETE CBC AUTOMATED: CPT

## 2020-03-25 PROCEDURE — 2580000003 HC RX 258: Performed by: NURSE PRACTITIONER

## 2020-03-25 RX ORDER — ASPIRIN 81 MG/1
81 TABLET ORAL DAILY
Status: DISCONTINUED | OUTPATIENT
Start: 2020-03-25 | End: 2020-03-26 | Stop reason: HOSPADM

## 2020-03-25 RX ORDER — POTASSIUM CHLORIDE 20 MEQ/1
40 TABLET, EXTENDED RELEASE ORAL ONCE
Status: COMPLETED | OUTPATIENT
Start: 2020-03-25 | End: 2020-03-25

## 2020-03-25 RX ORDER — LOSARTAN POTASSIUM 25 MG/1
25 TABLET ORAL DAILY
Status: DISCONTINUED | OUTPATIENT
Start: 2020-03-25 | End: 2020-03-26

## 2020-03-25 RX ORDER — CLONIDINE HYDROCHLORIDE 0.1 MG/1
0.1 TABLET ORAL EVERY 6 HOURS PRN
Status: DISCONTINUED | OUTPATIENT
Start: 2020-03-25 | End: 2020-03-26 | Stop reason: HOSPADM

## 2020-03-25 RX ADMIN — SODIUM CHLORIDE, PRESERVATIVE FREE 10 ML: 5 INJECTION INTRAVENOUS at 20:52

## 2020-03-25 RX ADMIN — LOSARTAN POTASSIUM 25 MG: 25 TABLET, FILM COATED ORAL at 10:08

## 2020-03-25 RX ADMIN — HEPARIN SODIUM 13 UNITS/KG/HR: 10000 INJECTION, SOLUTION INTRAVENOUS at 04:38

## 2020-03-25 RX ADMIN — METOPROLOL TARTRATE 50 MG: 50 TABLET, FILM COATED ORAL at 19:32

## 2020-03-25 RX ADMIN — SODIUM CHLORIDE, PRESERVATIVE FREE 10 ML: 5 INJECTION INTRAVENOUS at 08:38

## 2020-03-25 RX ADMIN — APIXABAN 10 MG: 5 TABLET, FILM COATED ORAL at 08:36

## 2020-03-25 RX ADMIN — VERAPAMIL HYDROCHLORIDE 240 MG: 240 TABLET, FILM COATED, EXTENDED RELEASE ORAL at 19:32

## 2020-03-25 RX ADMIN — RISPERIDONE 0.5 MG: 1 TABLET, FILM COATED ORAL at 20:52

## 2020-03-25 RX ADMIN — ASPIRIN 81 MG: 81 TABLET, COATED ORAL at 10:08

## 2020-03-25 RX ADMIN — APIXABAN 10 MG: 5 TABLET, FILM COATED ORAL at 20:51

## 2020-03-25 RX ADMIN — SODIUM CHLORIDE: 9 INJECTION, SOLUTION INTRAVENOUS at 04:37

## 2020-03-25 RX ADMIN — METOPROLOL TARTRATE 50 MG: 50 TABLET, FILM COATED ORAL at 08:36

## 2020-03-25 RX ADMIN — CLONIDINE HYDROCHLORIDE 0.1 MG: 0.1 TABLET ORAL at 18:46

## 2020-03-25 RX ADMIN — POTASSIUM CHLORIDE 40 MEQ: 1500 TABLET, EXTENDED RELEASE ORAL at 08:36

## 2020-03-25 RX ADMIN — RISPERIDONE 0.5 MG: 1 TABLET, FILM COATED ORAL at 08:36

## 2020-03-25 RX ADMIN — DESMOPRESSIN ACETATE 40 MG: 0.2 TABLET ORAL at 08:38

## 2020-03-25 ASSESSMENT — PAIN SCALES - GENERAL
PAINLEVEL_OUTOF10: 0

## 2020-03-25 NOTE — CARE COORDINATION
3/25/2020  Obtained information from patient's daughter Nohemy to obtain information and assist with potential discharge needs. Patient lives with Nohemy, one floor home. Nohemy is patient's primary carefgiver. She assists patient with Adl's, mostly verbal cueing. Patient is dependent upon family for Iadl's. She does not use any DME. She does not have history of Home Health or [facility]. Discussed possible home health at discharge, however Nohemy relayed patient may not respond well due to dementia diagnosis. SW will follow and assist with transition of care.

## 2020-03-25 NOTE — PROGRESS NOTES
OCCUPATIONAL THERAPY INITIAL EVALUATION      Date:3/25/2020  Patient Name: Cristobal Mesa  MRN: [id number]  : 10/26/1931  Room: [address] [place] [address]    Referring Provider: Delmer Parker DO    Evaluating OT: Shahriar Oliva, OTR/L [id number]    AM-PAC Daily Activity Raw Score:     Recommended Adaptive Equipment: TBA:AD, bathroom DME       Diagnosis: B PE    Reason for admission: s/p fall at home; found unresponsive on floor by daughter     Pertinent Medical History: Dementia, HTN, HLD, L carotid stenosis, R carotid occulsion      Precautions:  Falls, Dementia, bed alarm     Home Living: Pt lives with daughter  in a 1 story home per chart. Pt is a poor historian due to memory deficits. Bathroom setup: tub with rail; shower seat (accuracy of report?)  Equipment owned: TBA    Prior Level of Function: Assist with ADLs; Assist with IADLs. No device for ambulation. Driving: no    Pain Level: pt c/o no pain  this session      Cognition: A&O: 1/4  (self)  Follows 1 step commands with occasional re-direction. Memory: Poor STM   Comprehension fair-   Problem solving: P   Judgement/safety: P               Communication skills: grossly WFL           Vision: WFL               Glasses:?                                                   Hearing: WFL     RASS: 0  CAM-ICU: (NT) Delirium    UE Assessment:  Hand Dominance: Right [x]  Left []     ROM Strength STM goal: PRN   RUE  Shoulder grossly 90; WFL distal 3-/5 proximal; 4-/5 distal   WFL for ADLs     LUE Shoulder grossly 90;  WFL  Distal  3+/5 WFL for ADLs       Sensation: No c/o numbness or tingling in extremities   Tone: WNL   Edema: [facility]     Functional Assessment   Initial Eval Status  Date: 3/25 Treatment Status  Date: STG=LTG  5-14  days    Feeding Min A                        S; set up  while seated up in chair to increase activity tolerance        Grooming Max A  seated up in chair; decreased R shoulder ROM to perform tasks                        Min A         while seated sink level   UB dressing/bathing Max A                        Mod A       LB dressing/bathing Dep  decreased functional reach                        Max A   using AE as needed for safe reach/ energy conservation       Toileting NT                           Bed Mobility  Supine to sit: Max A+2; max directional cues     Sit to supine: NT                        Min A  in prep of ADL tasks & transfers   Functional Transfers Sit to stand: Mod A    Stand to sit: Mod A  Mod cues for hand placement                        Min A  sit<>stand/functional bathroom transfers using AD/DME as needed for balance and safety   Functional Mobility Mod A WW                       Min A   functional/bathroom mobility using AD as needed & demonstrating F safety     Balance Sitting:     Static:  Mod to Minda    Dynamic:Mod A  Standing: Mod A [employer]  SBA dynamic sitting balance; Min A dynamic standing balance  during ADL tasks & transfers   Endurance/Activity Tolerance   F tolerance with moderate activity. Pt sat EOB > 5min with no complaints. G   tolerance with moderate activity/self care routine   Visual/  Perceptual Impaired: R/L discrimination impaired; cues for environmental awareness during mobility ex                       Vitals:   Heart Rate at rest 68 bpm Heart Rate post session 78 bpm   SpO2 at rest 94% SpO2 post session 93%   Blood Pressure at rest 126/69 mmHg Blood Pressure post session -        Treatment: OT services provided include: Instruction on precautions prior to bed mobility to facilitate safe transfers and ADLS; sitting balance retraining ex's to improve righting reactions with postural changes during ADLS; adapted techniques to increase independence and safe reach during dressing/bathing activities; functional transfer training including postural cues, hand placement/sequencing & walker safety  to assist with balance and fall prevention. Comments: OK from RN to see patient.  Upon arrival, patient supine in bed;

## 2020-03-25 NOTE — PROGRESS NOTES
area   partial partial yes     ASSESSMENT:    Comments:  RN reported pt was stable for session. Pt was supine in bed upon arrival, agreeable to initial evaluation. Pt was unable to provide accurate social history and PLOF due to cognitive deficits. Pt required increased assistance and initiation for movement. Posterior lean noted in sitting. Pt ambulated with shuffled gait and decreased speed. [employer] assistance during ambulation. Pt was left in chair with all needs met and call light in reach. RN aware. Treatment:  Patient practiced and was instructed in the following treatment:     Bed mobility training - pt given verbal and tactile cues to facilitate proper sequencing and safety during supine>sit as well as provided with physical assistance to complete task    Sitting EOB for >8 minutes for upright tolerance, postural awareness and BLE ROM   Transfer training - pt was given verbal and tactile cues to facilitate proper hand placement, technique and safety during sit to stand and stand to sit as well as provided with physical assistance to complete task.  Gait training- pt was given verbal and tactile cues to facilitate upright posture, [employer] management and safety during ambulation as well as provided with physical assistance to complete task. Pt's/ family goals   1. Return home    Patient and or family understand(s) diagnosis, prognosis, and plan of care. yes    PLAN:    PT care will be provided in accordance with the objectives noted above. Exercises and functional mobility practice will be used as well as appropriate assistive devices or modalities to obtain goals. Patient and family education will also be administered as needed. Frequency of treatments: 2-5x/week x 1-2 weeks.     Time in  0910  Time out  0935    Total Treatment Time  20 minutes     Evaluation Time includes thorough review of current medical information, gathering information on past medical history/social history and prior level

## 2020-03-25 NOTE — FLOWSHEET NOTE
Dr Liam Burleson updated via phone heparin not ordered continue orders per Dr Akosua Dixon, ok to transfer

## 2020-03-25 NOTE — FLOWSHEET NOTE
Pt restless aggitated attempting to throw legs out of bed attempting to pull lines and tubes unable to redirect or focucs libby wrist and libby ankles applied

## 2020-03-25 NOTE — PROGRESS NOTES
Palliative Medicine   Progression of Care     Patient in bed at this time. Asked her how she felt, and she stated that she doesn't have any way to get to work. Patient with advanced dementia. Family would like patient to return home. Encounter for admission: Patient got out of bed, daughter put patient back in bed, 2 minutes later the patient ended up falling. Nohemy stated that her mother's dementia worsened after the death of her brother who was only 46yo. Nohemy stated that she will do everything to keep her mother around as long as possible. Nohemy does have a brother who will come help if needed. Nohemy thanked me for the call.        Palliative Care following closely for support of patient and family   Nessa BARNETT-CNP, AGAGISELLAP-BC

## 2020-03-25 NOTE — PROGRESS NOTES
APTT [[id number]] (Abnormal) Collected: 03/25/20 0009      Specimen: Blood Updated: 03/25/20 0044      aPTT 111.7       Repeat PTT results. IV Heparin dosage rate adjusted per nomogram/protocol. Repeat PTT in 6 hours.

## 2020-03-25 NOTE — PROGRESS NOTES
[facility]  Department of Internal Medicine  Division of Pulmonary, Critical Care and Sleep Medicine  Progress Note    Kacey Fish DO, Elizabeth Strickland MD, [facility]    Patient: Paxton Lemon  MRN: [id number]  : 10/26/1931    Encounter Time: 10:56 AM     Date of Admission: 3/24/2020  2:19 AM    Primary Care Physician: Fliiberto David MD    Reason for Consultation: Acute Pulmonary Embolism      Subjective:    [address]   APTT noted  Not a EKos or TPA candidate    OBJECTIVE:     PHYSICAL EXAM:   VITALS:     Intake/Output Summary (Last 24 hours) at 3/25/2020 1056  Last data filed at 3/25/2020 1000  Gross per 24 hour   Intake 1243 ml   Output 795 ml   Net 448 ml        CONSTITUTIONAL:   Alert  SKIN:     No rash,   HEENT:     EOMI, MMM, No thrush  NECK:    No bruits, No JVP apprechiated  CV:      Sinus,  Heave  PULMONARY:   Couse BS,  No Wheezing, No Rales, No Rhonchi     ABDOMEN:     Soft, non-tender. BS normal. No R/R/G  EXT:    No deformities . No clubbing.       + lower extremity edema, No venous stasis  PULSE:   Appears equal and palpable.   PSYCHIATRIC:  Dementia, No acute psycosis  MS:    No fractures, No gross weakness  NEUROLOGIC:   Non-focal     DATA: IMAGING & TESTING:     LABORATORY TESTS:    CBC:   Lab Results   Component Value Date    WBC 7.4 2020    RBC 3.60 2020    HGB 11.0 2020    HCT 34.6 2020    MCV 96.1 2020    MCH 30.6 2020    MCHC 31.8 2020    RDW 13.0 2020     2020    MPV 10.0 2020     CMP:    Lab Results   Component Value Date     2020    K 3.5 2020     2020    CO2 27 2020    BUN 10 2020    CREATININE 0.7 2020    GFRAA >60 2020    LABGLOM >60 2020    GLUCOSE 96 2020    PROT 6.9 2020    LABALBU 3.7 2020    CALCIUM 8.4 2020    BILITOT 0.4 2020    ALKPHOS 77 2020    AST 28

## 2020-03-25 NOTE — PROGRESS NOTES
[facility] Medicine    Subjective:  Pt alert conversive nir diet no sob      Current Facility-Administered Medications:     losartan (COZAAR) tablet 25 mg, 25 mg, Oral, Daily, Gloriarebeka Casaset Malmer, DO    apixaban (ELIQUIS) tablet 10 mg, 10 mg, Oral, BID, Gloriajean Yesseniaet Malmer, DO    sodium chloride flush 0.9 % injection 10 mL, 10 mL, Intravenous, 2 times per day, Juan Gander., DO, 10 mL at 03/24/20 2040    sodium chloride flush 0.9 % injection 10 mL, 10 mL, Intravenous, PRN, Juan Gander., DO, 10 mL at 03/24/20 0356    perflutren lipid microspheres (DEFINITY) injection 1.65 mg, 1.5 mL, Intravenous, ONCE PRN, Juan Gander., DO    metoprolol tartrate (LOPRESSOR) tablet 50 mg, 50 mg, Oral, BID, Gloriarebeka Casaset Malmer, DO, 50 mg at 03/24/20 2045    verapamil (CALAN SR) extended release tablet 240 mg, 240 mg, Oral, Nightly, Gloriarebeka Casaset Malmer, DO, 240 mg at 03/24/20 2042    acetaminophen (TYLENOL) tablet 650 mg, 650 mg, Oral, Q6H PRN **OR** acetaminophen (TYLENOL) suppository 650 mg, 650 mg, Rectal, Q6H PRN, Gloriajean Hatchet Malmer, DO    polyethylene glycol (GLYCOLAX) packet 17 g, 17 g, Oral, Daily PRN, Gloriajean Hatchet Malmer, DO    promethazine (PHENERGAN) tablet 12.5 mg, 12.5 mg, Oral, Q6H PRN **OR** ondansetron (ZOFRAN) injection 4 mg, 4 mg, Intravenous, Q6H PRN, Gloriajean Hatchet Malmer, DO    atorvastatin (LIPITOR) tablet 40 mg, 40 mg, Oral, Daily, Gloriajean Hatchet Malmer, DO, Stopped at 03/24/20 1249    risperiDONE (RISPERDAL) tablet 0.5 mg, 0.5 mg, Oral, BID, ERICKA Hay - CNP, 0.5 mg at 03/24/20 2042    Objective:    BP (!) 166/98   Pulse 63   Temp 97.3 °F (36.3 °C) (Temporal)   Resp 22   Ht 5' 1\" (1.549 m)   Wt 196 lb 3.4 oz (89 kg)   SpO2 98%   BMI 37.07 kg/m²     Heart:  Reg  Lungs:  CTA bilaterally, no wheeze, rales or rhonchi  Abd: bowel sounds present, nontender, nondistended, no masses  Extrem:  Min edema legs    CBC with Differential:    Lab Results   Component Value Date    WBC 7.4 03/25/2020    RBC 3.60 03/25/2020    HGB 11.0 03/25/2020    HCT 34.6 03/25/2020     03/25/2020    MCV 96.1 03/25/2020    MCH 30.6 03/25/2020    MCHC 31.8 03/25/2020    RDW 13.0 03/25/2020    LYMPHOPCT 23.1 03/24/2020    MONOPCT 5.5 03/24/2020    BASOPCT 0.3 03/24/2020    MONOSABS 0.66 03/24/2020    LYMPHSABS 2.76 03/24/2020    EOSABS 0.26 03/24/2020    BASOSABS 0.04 03/24/2020     CMP:    Lab Results   Component Value Date     03/25/2020    K 3.5 03/25/2020     03/25/2020    CO2 27 03/25/2020    BUN 10 03/25/2020    CREATININE 0.7 03/25/2020    GFRAA >60 03/25/2020    LABGLOM >60 03/25/2020    GLUCOSE 96 03/25/2020    PROT 6.9 03/24/2020    LABALBU 3.7 03/24/2020    CALCIUM 8.4 03/25/2020    BILITOT 0.4 03/24/2020    ALKPHOS 77 03/24/2020    AST 28 03/24/2020    ALT 16 03/24/2020     Warfarin PT/INR:    Lab Results   Component Value Date    INR 1.0 03/24/2020    INR 1.0 01/16/2014    PROTIME 11.8 03/24/2020    PROTIME 12.2  01/16/2014       Assessment:    Active Problems:    Pulmonary embolism, bilateral (HCC)    Dementia (HCC)    Hypertension    Acute saddle pulmonary embolism with acute cor pulmonale (HCC)    Right carotid artery occlusion    Left carotid stenosis    Fall    Acute pulmonary embolism without acute cor pulmonale (HCC)    Bilateral pulmonary embolism (HCC)  Resolved Problems:    * No resolved hospital problems.  *      Plan:  Change to eliquis transfer pt/ot eval start baby asa start additional bp med        [facility]  8:05 AM  3/25/2020

## 2020-03-25 NOTE — PLAN OF CARE
Problem: Falls - Risk of:  Goal: Will remain free from falls  Description: Will remain free from falls  3/25/2020 0921 by Bere Kern RN  Outcome: Met This Shift  3/25/2020 0000 by Lea Hernandez RN  Outcome: Met This Shift  Goal: Absence of physical injury  Description: Absence of physical injury  3/25/2020 0000 by Lea Hernandez RN  Outcome: Met This Shift     Problem: Infection:  Goal: Will remain free from infection  Description: Will remain free from infection  Outcome: Met This Shift     Problem: Safety:  Goal: Free from accidental physical injury  Description: Free from accidental physical injury  3/25/2020 0921 by Bere Kern RN  Outcome: Met This Shift  3/25/2020 0000 by Lea Hernandez RN  Outcome: Met This Shift  Goal: Free from intentional harm  Description: Free from intentional harm  3/25/2020 0000 by Lea Hernandez RN  Outcome: Met This Shift     Problem: Daily Care:  Goal: Daily care needs are met  Description: Daily care needs are met  3/25/2020 0000 by Lea Hernandez RN  Outcome: Met This Shift     Problem: Pain:  Goal: Patient's pain/discomfort is manageable  Description: Patient's pain/discomfort is manageable  3/25/2020 0921 by Bere Kern RN  Outcome: Met This Shift  3/25/2020 0000 by Lea Hernandez RN  Outcome: Met This Shift     Problem: Bleeding:  Goal: Will show no signs and symptoms of excessive bleeding  Description: Will show no signs and symptoms of excessive bleeding  Outcome: Met This Shift

## 2020-03-25 NOTE — PROGRESS NOTES
#[id number], all options, risks benefits and alternatives explained     #1 from a carotid artery point of view patient has chronic atherosclerotic occlusion, follow conservatively for now with close monitoring of carotid ultrasound at least once a year to evaluate left carotid artery and to call me immediately if any  neurological symptoms, explained eventually may consider adding a low-dose baby aspirin if possible the patient is tolerating anticoagulation well     #2 as far as the pulmonary embolus is concerned, as the 2D echo revealed mildly reduced right ventricle function with right ventricular systolic pressure of 40 mmHg, for now follow conservatively with anticoagulation with IV heparin, may consider to Eliquis tomorrow if patient is stable otherwise and if okay with all the consultants on the case    Today I have discussed the patient, explained her the situation, reassured her instructed her to call me PRN if any neurological symptoms or any increasing chest pain or shortness of breath also recommended to make sure that she does follow-up with her PCP regarding any screening measures like colonoscopy and mammogram and GYN examination etc. I have also discussed with Dr. Whitten Frames murmur,    Okay to discontinue heparin and start the patient on Eliquis when discharged, I will follow the patient in 6 months in the office regarding the carotid artery    Today I personally discussed over the telephone with the patient's daughter Clyde Malave, regarding a follow-up appointment see me in 6 months and also regarding screening measures like Pap smear, mammogram, colonoscopy if not done recently and to discuss with her PCP as an outpatient, all her questions were answered        Wilber Gonzalez

## 2020-03-26 VITALS
HEART RATE: 70 BPM | HEIGHT: 61 IN | OXYGEN SATURATION: 96 % | WEIGHT: 196.21 LBS | SYSTOLIC BLOOD PRESSURE: 176 MMHG | DIASTOLIC BLOOD PRESSURE: 93 MMHG | BODY MASS INDEX: 37.04 KG/M2 | TEMPERATURE: 97.7 F | RESPIRATION RATE: 16 BRPM

## 2020-03-26 PROCEDURE — 97530 THERAPEUTIC ACTIVITIES: CPT | Performed by: PHYSICAL THERAPIST

## 2020-03-26 PROCEDURE — 6370000000 HC RX 637 (ALT 250 FOR IP): Performed by: INTERNAL MEDICINE

## 2020-03-26 PROCEDURE — 6370000000 HC RX 637 (ALT 250 FOR IP): Performed by: NURSE PRACTITIONER

## 2020-03-26 PROCEDURE — 99232 SBSQ HOSP IP/OBS MODERATE 35: CPT | Performed by: INTERNAL MEDICINE

## 2020-03-26 RX ORDER — LOSARTAN POTASSIUM 50 MG/1
50 TABLET ORAL DAILY
Status: DISCONTINUED | OUTPATIENT
Start: 2020-03-26 | End: 2020-03-26 | Stop reason: HOSPADM

## 2020-03-26 RX ORDER — LOSARTAN POTASSIUM 50 MG/1
50 TABLET ORAL DAILY
Qty: 30 TABLET | Refills: 0 | Status: SHIPPED | OUTPATIENT
Start: 2020-03-27

## 2020-03-26 RX ORDER — ATORVASTATIN CALCIUM 40 MG/1
40 TABLET, FILM COATED ORAL DAILY
Qty: 30 TABLET | Refills: 0 | Status: ON HOLD | OUTPATIENT
Start: 2020-03-27 | End: 2020-04-04 | Stop reason: HOSPADM

## 2020-03-26 RX ADMIN — APIXABAN 10 MG: 5 TABLET, FILM COATED ORAL at 08:47

## 2020-03-26 RX ADMIN — LOSARTAN POTASSIUM 50 MG: 25 TABLET, FILM COATED ORAL at 08:47

## 2020-03-26 RX ADMIN — RISPERIDONE 0.5 MG: 1 TABLET, FILM COATED ORAL at 08:47

## 2020-03-26 RX ADMIN — DESMOPRESSIN ACETATE 40 MG: 0.2 TABLET ORAL at 08:47

## 2020-03-26 RX ADMIN — METOPROLOL TARTRATE 50 MG: 50 TABLET, FILM COATED ORAL at 08:47

## 2020-03-26 RX ADMIN — ASPIRIN 81 MG: 81 TABLET, COATED ORAL at 08:47

## 2020-03-26 ASSESSMENT — PAIN SCALES - GENERAL: PAINLEVEL_OUTOF10: 0

## 2020-03-26 NOTE — PROGRESS NOTES
Removed SWR from pt since pt calm, not attempting to removed IV/Vela/being combative. Remains confused but cooperative, quiet, sleeping on and off, will continue to monitor.  Electronically signed by Mike Roewll RN on 3/26/2020 at 5:35 AM

## 2020-03-26 NOTE — PROGRESS NOTES
Repeat /80 after HS meds, will continue to monitor. Pt becoming more agitated, confused, emotional support provided. SWR on for pts safety.  Electronically signed by José Mccallum RN on 3/25/2020 at 9:07 PM

## 2020-03-26 NOTE — PROGRESS NOTES
agonal with finding of a large pulmonary artery with advanced carotid disease. 1. Massive pulmonary embolism   2. Dementia  3. CAD  4. PVD  5. Carotid Occusive disease          Plan:   1. Per PCP   2.  Ok to discharge         Toya Perrin, MPH, Dajuan Lazaro  Professor of Internal Medicine  Pulmonary, Critical Care and Sleep Medicine

## 2020-03-26 NOTE — PLAN OF CARE
Problem: Falls - Risk of:  Goal: Will remain free from falls  Description: Will remain free from falls  Outcome: Met This Shift  Goal: Absence of physical injury  Description: Absence of physical injury  Outcome: Met This Shift     Problem: Infection:  Goal: Will remain free from infection  Description: Will remain free from infection  Outcome: Met This Shift     Problem: Safety:  Goal: Free from accidental physical injury  Description: Free from accidental physical injury  Outcome: Met This Shift  Goal: Free from intentional harm  Description: Free from intentional harm  Outcome: Met This Shift     Problem: Daily Care:  Goal: Daily care needs are met  Description: Daily care needs are met  Outcome: Met This Shift     Problem: Pain:  Goal: Patient's pain/discomfort is manageable  Description: Patient's pain/discomfort is manageable  Outcome: Met This Shift     Problem: Skin Integrity:  Goal: Skin integrity will stabilize  Description: Skin integrity will stabilize  Outcome: Met This Shift

## 2020-03-26 NOTE — PROGRESS NOTES
Pt continues to remain calm and not pulling at lines/tubing/combative, SWR remains off and will continue to monitor.  Electronically signed by Aureliano Magallon RN on 3/26/2020 at 5:36 AM

## 2020-03-26 NOTE — PLAN OF CARE
Problem: Falls - Risk of:  Goal: Will remain free from falls  Description: Will remain free from falls  Outcome: Met This Shift  Goal: Absence of physical injury  Description: Absence of physical injury  Outcome: Met This Shift     Problem: Infection:  Goal: Will remain free from infection  Description: Will remain free from infection  Outcome: Met This Shift     Problem: Safety:  Goal: Free from accidental physical injury  Description: Free from accidental physical injury  Outcome: Met This Shift  Goal: Free from intentional harm  Description: Free from intentional harm  Outcome: Met This Shift     Problem: Daily Care:  Goal: Daily care needs are met  Description: Daily care needs are met  Outcome: Met This Shift     Problem: Pain:  Goal: Patient's pain/discomfort is manageable  Description: Patient's pain/discomfort is manageable  Outcome: Met This Shift     Problem: Skin Integrity:  Goal: Skin integrity will stabilize  Description: Skin integrity will stabilize  Outcome: Met This Shift     Problem: Restraint Use - Nonviolent/Non-Self-Destructive Behavior:  Goal: Absence of restraint indications  Description: Absence of restraint indications  Outcome: Met This Shift  Goal: Absence of restraint-related injury  Description: Absence of restraint-related injury  Outcome: Met This Shift

## 2020-03-27 ENCOUNTER — CARE COORDINATION (OUTPATIENT)
Dept: CASE MANAGEMENT | Age: 85
End: 2020-03-27

## 2020-03-27 NOTE — CARE COORDINATION
COVID-19 at risk initial outreach call, no answer. left VM on daughters cell phone with contact information and request for return call. CTN will continue with outreach attempt calls.

## 2020-03-29 LAB
BLOOD CULTURE, ROUTINE: NORMAL
CULTURE, BLOOD 2: NORMAL

## 2020-03-30 ENCOUNTER — CARE COORDINATION (OUTPATIENT)
Dept: CASE MANAGEMENT | Age: 85
End: 2020-03-30

## 2020-03-30 NOTE — CARE COORDINATION
Attempted to contact patient regarding last hospital visit,       Franco Pepper, [facility] Coordination Transition

## 2020-03-30 NOTE — DISCHARGE SUMMARY
[address]                  [address] [facility],  [facility]                               DISCHARGE SUMMARY    PATIENT NAME: Olivia Fowler                        :        10/26/1931  MED REC NO:   [id number]                            ROOM:       6501  ACCOUNT NO:   [de-identified]                           ADMIT DATE: 2020  PROVIDER:     Ivory Uribe DO                  DISCHARGE DATE:  2020    FINAL DIAGNOSES:  1.  Bilateral pulmonary emboli. 2.  Acute respiratory failure with hypoxia. 3.  Cerebrovascular disease. 4.  Hypertension. 5.  Dementia. HOSPITAL COURSE:  The patient is an 80-year-old  female who  presented to the emergency room for evaluation. She fell at home. She  had confusion and shortness of breath. She was seen in the emergency  room. Diagnostic evaluation revealed pulmonary emboli, cerebrovascular  disease. She was admitted to the hospital for further evaluation and  treatment. She was started on anticoagulation. She was seen by  Pulmonary as well as Vascular. Her status stabilized and she was  discharged to home in stable condition on 2020. DISCHARGE MEDICATIONS:  As per discharge med rec which include;  1.  Eliquis 5 mg two b.i.d. x7 days then one b.i.d.  2.  Lipitor 40 mg daily. 3.  Losartan 50 mg daily. 4.  Aspirin 81 mg daily. 5.  Calan  mg daily. 6.  Os-August 500 mg b.i.d.  7.  Lopressor 50 mg b.i.d.  8.  Multivitamin daily. 9.  Risperdal as directed. 10.  Maxzide 25 one daily p.r.n. as directed. DISCHARGE INSTRUCTIONS:  The patient was instructed to follow up with  her primary care physician, Dr. Daniel Zepeda, call office for  appointment. Follow up with Dr. Lisa Wolfe in six months, call office  for appointment.         Avi Vizcaino DO    D: 2020 12:01:06       T: 2020 12:04:35     DAYNA/S_RONAN_Malcolm  Job#: [id number]     Doc#: [id number]    CC:  Adilia Cormier

## 2020-03-31 ENCOUNTER — CARE COORDINATION (OUTPATIENT)
Dept: CASE MANAGEMENT | Age: 85
End: 2020-03-31

## 2020-03-31 NOTE — CARE COORDINATION
COVID-19 at risk 3rd outreach call, no answer. left VM with contact information and request for return call.         Giovanni Bravo, [address]  [facility] Coordination Transition

## 2020-04-02 ENCOUNTER — HOSPITAL ENCOUNTER (INPATIENT)
Age: 85
LOS: 2 days | Discharge: HOSPICE | DRG: 064 | End: 2020-04-04
Attending: EMERGENCY MEDICINE | Admitting: INTERNAL MEDICINE
Payer: MEDICARE

## 2020-04-02 ENCOUNTER — APPOINTMENT (OUTPATIENT)
Dept: GENERAL RADIOLOGY | Age: 85
DRG: 064 | End: 2020-04-02
Payer: MEDICARE

## 2020-04-02 ENCOUNTER — APPOINTMENT (OUTPATIENT)
Dept: CT IMAGING | Age: 85
DRG: 064 | End: 2020-04-02
Payer: MEDICARE

## 2020-04-02 PROBLEM — I63.9 ACUTE CVA (CEREBROVASCULAR ACCIDENT) (HCC): Status: ACTIVE | Noted: 2020-04-02

## 2020-04-02 PROBLEM — R41.0 ACUTE DELIRIUM: Status: ACTIVE | Noted: 2020-04-02

## 2020-04-02 PROBLEM — I65.21 RIGHT CAROTID ARTERY OCCLUSION: Chronic | Status: ACTIVE | Noted: 2020-03-24

## 2020-04-02 PROBLEM — Z79.01 CHRONIC ANTICOAGULATION: Chronic | Status: ACTIVE | Noted: 2020-04-02

## 2020-04-02 PROBLEM — E78.5 HYPERLIPIDEMIA: Chronic | Status: ACTIVE | Noted: 2020-04-02

## 2020-04-02 PROBLEM — I65.22 LEFT CAROTID STENOSIS: Chronic | Status: ACTIVE | Noted: 2020-03-24

## 2020-04-02 LAB
ALBUMIN SERPL-MCNC: 3.9 G/DL (ref 3.5–5.2)
ALP BLD-CCNC: 78 U/L (ref 35–104)
ALT SERPL-CCNC: 13 U/L (ref 0–32)
ANION GAP SERPL CALCULATED.3IONS-SCNC: 11 MMOL/L (ref 7–16)
APTT: 34.6 SEC (ref 24.5–35.1)
AST SERPL-CCNC: 20 U/L (ref 0–31)
BASOPHILS ABSOLUTE: 0.04 E9/L (ref 0–0.2)
BASOPHILS RELATIVE PERCENT: 0.4 % (ref 0–2)
BILIRUB SERPL-MCNC: 0.6 MG/DL (ref 0–1.2)
BUN BLDV-MCNC: 15 MG/DL (ref 8–23)
CALCIUM SERPL-MCNC: 9.8 MG/DL (ref 8.6–10.2)
CHLORIDE BLD-SCNC: 100 MMOL/L (ref 98–107)
CHOLESTEROL, TOTAL: 139 MG/DL (ref 0–199)
CHP ED QC CHECK: YES
CO2: 28 MMOL/L (ref 22–29)
CREAT SERPL-MCNC: 0.8 MG/DL (ref 0.5–1)
EKG ATRIAL RATE: 75 BPM
EKG P AXIS: 32 DEGREES
EKG P-R INTERVAL: 204 MS
EKG Q-T INTERVAL: 406 MS
EKG QRS DURATION: 66 MS
EKG QTC CALCULATION (BAZETT): 453 MS
EKG R AXIS: -9 DEGREES
EKG T AXIS: 74 DEGREES
EKG VENTRICULAR RATE: 75 BPM
EOSINOPHILS ABSOLUTE: 0.14 E9/L (ref 0.05–0.5)
EOSINOPHILS RELATIVE PERCENT: 1.5 % (ref 0–6)
GFR AFRICAN AMERICAN: >60
GFR NON-AFRICAN AMERICAN: >60 ML/MIN/1.73
GLUCOSE BLD-MCNC: 147 MG/DL (ref 74–99)
GLUCOSE BLD-MCNC: 154 MG/DL
HBA1C MFR BLD: 5.3 % (ref 4–5.6)
HCT VFR BLD CALC: 41.4 % (ref 34–48)
HDLC SERPL-MCNC: 39 MG/DL
HEMOGLOBIN: 13.4 G/DL (ref 11.5–15.5)
IMMATURE GRANULOCYTES #: 0.04 E9/L
IMMATURE GRANULOCYTES %: 0.4 % (ref 0–5)
INR BLD: 1.9
LDL CHOLESTEROL CALCULATED: 70 MG/DL (ref 0–99)
LYMPHOCYTES ABSOLUTE: 1.38 E9/L (ref 1.5–4)
LYMPHOCYTES RELATIVE PERCENT: 14.4 % (ref 20–42)
MCH RBC QN AUTO: 31.5 PG (ref 26–35)
MCHC RBC AUTO-ENTMCNC: 32.4 % (ref 32–34.5)
MCV RBC AUTO: 97.4 FL (ref 80–99.9)
METER GLUCOSE: 154 MG/DL (ref 74–99)
MONOCYTES ABSOLUTE: 0.57 E9/L (ref 0.1–0.95)
MONOCYTES RELATIVE PERCENT: 5.9 % (ref 2–12)
NEUTROPHILS ABSOLUTE: 7.43 E9/L (ref 1.8–7.3)
NEUTROPHILS RELATIVE PERCENT: 77.4 % (ref 43–80)
PDW BLD-RTO: 12.8 FL (ref 11.5–15)
PLATELET # BLD: 336 E9/L (ref 130–450)
PMV BLD AUTO: 10.4 FL (ref 7–12)
POTASSIUM SERPL-SCNC: 4.6 MMOL/L (ref 3.5–5)
PROTHROMBIN TIME: 21.9 SEC (ref 9.3–12.4)
RBC # BLD: 4.25 E12/L (ref 3.5–5.5)
SODIUM BLD-SCNC: 139 MMOL/L (ref 132–146)
TOTAL PROTEIN: 6.7 G/DL (ref 6.4–8.3)
TRIGL SERPL-MCNC: 151 MG/DL (ref 0–149)
VLDLC SERPL CALC-MCNC: 30 MG/DL
WBC # BLD: 9.6 E9/L (ref 4.5–11.5)

## 2020-04-02 PROCEDURE — 82962 GLUCOSE BLOOD TEST: CPT

## 2020-04-02 PROCEDURE — 99291 CRITICAL CARE FIRST HOUR: CPT

## 2020-04-02 PROCEDURE — 97166 OT EVAL MOD COMPLEX 45 MIN: CPT

## 2020-04-02 PROCEDURE — 85730 THROMBOPLASTIN TIME PARTIAL: CPT

## 2020-04-02 PROCEDURE — 2060000000 HC ICU INTERMEDIATE R&B

## 2020-04-02 PROCEDURE — 93005 ELECTROCARDIOGRAM TRACING: CPT | Performed by: EMERGENCY MEDICINE

## 2020-04-02 PROCEDURE — 70498 CT ANGIOGRAPHY NECK: CPT

## 2020-04-02 PROCEDURE — 6370000000 HC RX 637 (ALT 250 FOR IP): Performed by: INTERNAL MEDICINE

## 2020-04-02 PROCEDURE — 80061 LIPID PANEL: CPT

## 2020-04-02 PROCEDURE — 85610 PROTHROMBIN TIME: CPT

## 2020-04-02 PROCEDURE — 70450 CT HEAD/BRAIN W/O DYE: CPT

## 2020-04-02 PROCEDURE — 97535 SELF CARE MNGMENT TRAINING: CPT

## 2020-04-02 PROCEDURE — 0042T CT BRAIN PERFUSION: CPT

## 2020-04-02 PROCEDURE — 70496 CT ANGIOGRAPHY HEAD: CPT

## 2020-04-02 PROCEDURE — 6360000004 HC RX CONTRAST MEDICATION: Performed by: RADIOLOGY

## 2020-04-02 PROCEDURE — 83036 HEMOGLOBIN GLYCOSYLATED A1C: CPT

## 2020-04-02 PROCEDURE — 93010 ELECTROCARDIOGRAM REPORT: CPT | Performed by: INTERNAL MEDICINE

## 2020-04-02 PROCEDURE — 2580000003 HC RX 258: Performed by: INTERNAL MEDICINE

## 2020-04-02 PROCEDURE — 80053 COMPREHEN METABOLIC PANEL: CPT

## 2020-04-02 PROCEDURE — 97161 PT EVAL LOW COMPLEX 20 MIN: CPT | Performed by: PHYSICAL THERAPIST

## 2020-04-02 PROCEDURE — 36415 COLL VENOUS BLD VENIPUNCTURE: CPT

## 2020-04-02 PROCEDURE — 85025 COMPLETE CBC W/AUTO DIFF WBC: CPT

## 2020-04-02 PROCEDURE — 97530 THERAPEUTIC ACTIVITIES: CPT | Performed by: PHYSICAL THERAPIST

## 2020-04-02 PROCEDURE — 99221 1ST HOSP IP/OBS SF/LOW 40: CPT | Performed by: PSYCHIATRY & NEUROLOGY

## 2020-04-02 PROCEDURE — 71045 X-RAY EXAM CHEST 1 VIEW: CPT

## 2020-04-02 RX ORDER — ACETAMINOPHEN 650 MG/1
650 SUPPOSITORY RECTAL EVERY 6 HOURS PRN
Status: DISCONTINUED | OUTPATIENT
Start: 2020-04-02 | End: 2020-04-04 | Stop reason: HOSPADM

## 2020-04-02 RX ORDER — SODIUM CHLORIDE 0.9 % (FLUSH) 0.9 %
10 SYRINGE (ML) INJECTION EVERY 12 HOURS SCHEDULED
Status: DISCONTINUED | OUTPATIENT
Start: 2020-04-02 | End: 2020-04-04 | Stop reason: HOSPADM

## 2020-04-02 RX ORDER — HYDRALAZINE HYDROCHLORIDE 20 MG/ML
10 INJECTION INTRAMUSCULAR; INTRAVENOUS EVERY 6 HOURS PRN
Status: DISCONTINUED | OUTPATIENT
Start: 2020-04-02 | End: 2020-04-04 | Stop reason: HOSPADM

## 2020-04-02 RX ORDER — POLYETHYLENE GLYCOL 3350 17 G/17G
17 POWDER, FOR SOLUTION ORAL DAILY PRN
Status: DISCONTINUED | OUTPATIENT
Start: 2020-04-02 | End: 2020-04-04 | Stop reason: HOSPADM

## 2020-04-02 RX ORDER — PROMETHAZINE HYDROCHLORIDE 25 MG/1
12.5 TABLET ORAL EVERY 6 HOURS PRN
Status: DISCONTINUED | OUTPATIENT
Start: 2020-04-02 | End: 2020-04-04 | Stop reason: HOSPADM

## 2020-04-02 RX ORDER — ASPIRIN 81 MG/1
81 TABLET, CHEWABLE ORAL DAILY
Status: DISCONTINUED | OUTPATIENT
Start: 2020-04-02 | End: 2020-04-04 | Stop reason: HOSPADM

## 2020-04-02 RX ORDER — METOPROLOL TARTRATE 50 MG/1
50 TABLET, FILM COATED ORAL 2 TIMES DAILY
Status: DISCONTINUED | OUTPATIENT
Start: 2020-04-02 | End: 2020-04-04 | Stop reason: HOSPADM

## 2020-04-02 RX ORDER — SODIUM CHLORIDE 0.9 % (FLUSH) 0.9 %
10 SYRINGE (ML) INJECTION
Status: ACTIVE | OUTPATIENT
Start: 2020-04-02 | End: 2020-04-02

## 2020-04-02 RX ORDER — ATORVASTATIN CALCIUM 40 MG/1
40 TABLET, FILM COATED ORAL DAILY
Status: DISCONTINUED | OUTPATIENT
Start: 2020-04-02 | End: 2020-04-04 | Stop reason: HOSPADM

## 2020-04-02 RX ORDER — SODIUM CHLORIDE 0.9 % (FLUSH) 0.9 %
10 SYRINGE (ML) INJECTION PRN
Status: DISCONTINUED | OUTPATIENT
Start: 2020-04-02 | End: 2020-04-04 | Stop reason: HOSPADM

## 2020-04-02 RX ORDER — VERAPAMIL HYDROCHLORIDE 240 MG/1
240 TABLET, FILM COATED, EXTENDED RELEASE ORAL NIGHTLY
Status: DISCONTINUED | OUTPATIENT
Start: 2020-04-02 | End: 2020-04-04 | Stop reason: HOSPADM

## 2020-04-02 RX ORDER — LOSARTAN POTASSIUM 50 MG/1
50 TABLET ORAL DAILY
Status: DISCONTINUED | OUTPATIENT
Start: 2020-04-02 | End: 2020-04-04 | Stop reason: HOSPADM

## 2020-04-02 RX ORDER — LABETALOL HYDROCHLORIDE 5 MG/ML
10 INJECTION, SOLUTION INTRAVENOUS EVERY 6 HOURS PRN
Status: DISCONTINUED | OUTPATIENT
Start: 2020-04-02 | End: 2020-04-04 | Stop reason: HOSPADM

## 2020-04-02 RX ORDER — ACETAMINOPHEN 325 MG/1
650 TABLET ORAL EVERY 6 HOURS PRN
Status: DISCONTINUED | OUTPATIENT
Start: 2020-04-02 | End: 2020-04-04 | Stop reason: HOSPADM

## 2020-04-02 RX ORDER — ONDANSETRON 2 MG/ML
4 INJECTION INTRAMUSCULAR; INTRAVENOUS EVERY 6 HOURS PRN
Status: DISCONTINUED | OUTPATIENT
Start: 2020-04-02 | End: 2020-04-04 | Stop reason: HOSPADM

## 2020-04-02 RX ADMIN — DESMOPRESSIN ACETATE 40 MG: 0.2 TABLET ORAL at 19:40

## 2020-04-02 RX ADMIN — IOPAMIDOL 100 ML: 755 INJECTION, SOLUTION INTRAVENOUS at 12:27

## 2020-04-02 RX ADMIN — SODIUM CHLORIDE, PRESERVATIVE FREE 10 ML: 5 INJECTION INTRAVENOUS at 19:39

## 2020-04-02 RX ADMIN — APIXABAN 5 MG: 5 TABLET, FILM COATED ORAL at 19:39

## 2020-04-02 ASSESSMENT — PAIN SCALES - GENERAL
PAINLEVEL_OUTOF10: 0
PAINLEVEL_OUTOF10: 0

## 2020-04-02 NOTE — VIRTUAL HEALTH
Social History Narrative    Not on file     Family History  No family history on file. OBJECTIVE  BP (!) 182/103   Pulse 69   Temp 96.1 °F (35.6 °C) (Temporal)   Resp 18   Wt 196 lb (88.9 kg)   SpO2 96%   BMI 37.03 kg/m²     NIH Stroke Scale  Interval: Baseline  Level of Consciousness (1a. ): Alert  LOC Questions (1b. ): Answers one correctly  LOC Commands (1c. ): Performs both tasks correctly  Best Gaze (2. ): (!) Partial gaze palsy  Visual (3. ): (!) Partial hemianopia  Facial Palsy (4. ): (!) Minor paralysis(Left sided facial )  Motor Arm, Left (5a. ): No drift  Motor Arm, Right (5b. ): No drift  Motor Leg, Left (6a. ): Drift, but does not hit bed  Motor Leg, Right (6b. ): Drift, but does not hit bed  Limb Ataxia (7. ): Absent  Sensory (8. ): Normal  Best Language (9. ): No aphasia  Dysarthria (10. ): Normal  Extinction and Inattention (11): (!) Visual, tactile, auditory, spatial, or personal inattention  Total: 7  Pre-Morbid mRS: 1    Imaging:  Images were personally reviewed including:  CT brain without contrast: nothing acute, no bleeding  CTA imaging: R ICA occlusion that is chronic, decrease branches in the right M4? Assessment      80year old woman with recently hx of saddled PE, on eliquis and now with acute onset of mild aphasia and confusion. Recommendations:  1. NIH 7  2. Recommend Inpatient Neurology Consult for further assessment and evaluation   3.  consider . [id number]  4. Not a tPA candidate due to being on eliquis, she took it this AM. Not a thrombectomy candidate due to no LVO. 5. Pt likely had a small stroke in the right parietal temporal lobe, this could be due to her massive clot from the PE  6. Recommend admission and consult neurology for follow up  7. MRI brain w/o contrast  8. Consider changing eliquis to xarelto if she has a stroke on the MRI.         Discussed with ED Physician Dr. Zion Granado    At least 39 min of Telemedicine and time in conversation directly with ED

## 2020-04-02 NOTE — PROGRESS NOTES
OCCUPATIONAL THERAPY INITIAL EVALUATION      Date:2020  Patient Name: Monik Sarkar  MRN: [id number]  : 10/26/1931  Room: [address] [place] [address]    Evaluating OT: Rose Human, MOT, OTR/L [id number]    AM-PAC Daily Activity Raw Score:   Recommended Adaptive Equipment: TBD     Modified Al Scale (MRS)  Score     Description  0             No symptoms  1             No significant disability despite symptoms  2             Slight disability; able to look after own affairs  3             Moderate disability; able to ambulate without assist/ requires assist with ADLs  4             Moderate/Severe disability;requires assist to ambulate/assist with ADLs  5             Severe disability;bedridden/incontinent   6               Score: 4    Diagnosis: CVA   Referring Practitioner: Alejandro Murphy DO  Surgery: n/a   Pertinent Medical History: dementia, PE HTN, R ICA occlusion, L carotid stenosis   Precautions:  Falls, impulsivity/agitation, TSM     Home Living: Pt presents as a poor historian. She reports she lives with her  in a ? with ? step(s) to enter and ? rail(s); bed/bath on ? Bathroom setup: ?   Equipment owned: ?  Prior Level of Function: IND? with ADLs/IADLs; using no AD? for functional mobility   Driving: yes? Pain Level: 0/10  Cognition: A&O: 2/4 (self, place with cues provided); Follows 1 step directions, with repetition and increased time   Memory:  poor   Sequencing:  poor   Problem solving:  poor   Judgement/safety:  poor     Functional Assessment:   Initial Eval Status  Date: 2020 Treatment Status  Date: Short Term Goals  Treatment frequency: 2-5x/wk   Feeding Max A (pt able to hold cup and drink but then would required vc's)   Min A   Grooming Dep (due to difficulty sequencing)   Mod A   UB Dressing Max A   Mod A   LB Dressing Dep (assist with shoes)  Mod A    Bathing dep  Mod A    Toileting dep  Mod A   Bed Mobility  Log roll:  Mod A  Supine to sit: Mod A   Sit to supine: Max x 2 (due to

## 2020-04-02 NOTE — ED PROVIDER NOTES
Department of Emergency Medicine   ED  Provider Note  Admit Date/RoomTime: 4/2/2020 12:10 PM  ED Room: 16/16          History of Present Illness:  4/2/20, Time: 12:20 PM EDT  Chief Complaint   Patient presents with    Altered Mental Status     LKW: 1030am, was eating breakfast when the patient stopped responding to family. Pt has clear speech, but per EMS pt has repeatitive questioning. dx with PE and placed on Eliquis recently                Toya Bloom is a 80 y.o. female presenting to the ED for altered mental status, beginning at 1030 this morning. The complaint has been persistent, moderate in severity, and worsened by nothing. At 1030 this morning patient's daughter noted that the patient appeared to not respond but had her eyes open, not able to speak. EMS was summoned. They state that en route to the hospital patient began speaking and appeared more oriented. Upon arrival patient is awake and alert, appears somewhat confused, will obey some commands but not others. Patient is poor historian but denies any headache or blurred vision, no lightheadedness or dizziness. There is been no reported fall or head trauma. Review of Systems:   Pertinent positives and negatives are stated within HPI, all other systems reviewed and are negative.        --------------------------------------------- PAST HISTORY ---------------------------------------------  Past Medical History:  has a past medical history of Hyperlipidemia, Hypertension, Left carotid stenosis, and Right carotid artery occlusion. Past Surgical History:  has a past surgical history that includes Hysterectomy. Social History:  reports that she has never smoked. She has never used smokeless tobacco. She reports that she does not drink alcohol or use drugs. Family History: family history is not on file. . Unless otherwise noted, family history is non contributory    The patients home medications have been reviewed.     Allergies: Patient 6B: Test Right Leg Motor Drift 0 - no drift; leg holds 30 degree position for full 5 seconds   7: Test Limb Ataxia   (FNF/Heel-Shin) 0 - absent   8: Test Sensation 0 - normal; no sensory loss   9: Test Language/Aphasia 0 - no aphasia, normal   10: Test Dysarthria 0 - normal   11: Test Extinction/Inattention 0 - no abnormality   Total Score: 6   4/2/20 at upon arrival.      Acute CVA Core Measures:      - t-PA Eligibility: IV t-PA was considered and not given due to violations in inclusion criteria including patient on Eliquis   - The case has been discussed with Dr. Jelani Dnig, they agree this patient is NOT t-PA eligible.           -------------------------------------------------- RESULTS -------------------------------------------------  I have personally reviewed all laboratory and imaging results for this patient. Results are listed below.      LABS: (Lab results interpreted by me)  Results for orders placed or performed during the hospital encounter of 04/02/20   CBC Auto Differential   Result Value Ref Range    WBC 9.6 4.5 - 11.5 E9/L    RBC 4.25 3.50 - 5.50 E12/L    Hemoglobin 13.4 11.5 - 15.5 g/dL    Hematocrit 41.4 34.0 - 48.0 %    MCV 97.4 80.0 - 99.9 fL    MCH 31.5 26.0 - 35.0 pg    MCHC 32.4 32.0 - 34.5 %    RDW 12.8 11.5 - 15.0 fL    Platelets 649 443 - 794 E9/L    MPV 10.4 7.0 - 12.0 fL    Neutrophils % 77.4 43.0 - 80.0 %    Immature Granulocytes % 0.4 0.0 - 5.0 %    Lymphocytes % 14.4 (L) 20.0 - 42.0 %    Monocytes % 5.9 2.0 - 12.0 %    Eosinophils % 1.5 0.0 - 6.0 %    Basophils % 0.4 0.0 - 2.0 %    Neutrophils Absolute 7.43 (H) 1.80 - 7.30 E9/L    Immature Granulocytes # 0.04 E9/L    Lymphocytes Absolute 1.38 (L) 1.50 - 4.00 E9/L    Monocytes Absolute 0.57 0.10 - 0.95 E9/L    Eosinophils Absolute 0.14 0.05 - 0.50 E9/L    Basophils Absolute 0.04 0.00 - 0.20 E9/L   Comprehensive Metabolic Panel   Result Value Ref Range    Sodium 139 132 - 146 mmol/L    Potassium 4.6 3.5 - 5.0 mmol/L    Chloride 100 98 - 107 intracranial hemorrhage or mass effect. Findings compatible with extensive chronic microvascular ischemia. Atherosclerosis of the internal carotid arteries. Moderate cerebral and cerebellar atrophy. The findings were discussed with ED physician Dr. Aj Pnik at   approximately [phone] hours on 2020. EKG Interpretation  Interpreted by emergency department physician, Dr. Aj Pink    Date of EK20  Time: 1242    Rhythm: normal sinus   Rate: 72  Axis: normal  Conduction: normal  ST Segments: normal  T Waves: normal    Clinical Impression: No findings suggestive of acute ischemia or injury  Comparison to prior EKG: stable as compared to patient's most recent EKG      ------------------------- NURSING NOTES AND VITALS REVIEWED ---------------------------   The nursing notes within the ED encounter and vital signs as below have been reviewed by myself  BP (!) 182/103   Pulse 69   Temp 96.1 °F (35.6 °C) (Temporal)   Resp 18   Wt 196 lb (88.9 kg)   SpO2 96%   BMI 37.03 kg/m²     Oxygen Saturation Interpretation: Normal    The patients available past medical records and past encounters were reviewed. ------------------------------ ED COURSE/MEDICAL DECISION MAKING----------------------  Medications   sodium chloride flush 0.9 % injection 10 mL (has no administration in time range)   iopamidol (ISOVUE-370) 76 % injection 100 mL (100 mLs Intravenous Given 20 1227)           The cardiac monitor revealed sinus rhythm with a heart rate in the 70s as interpreted by me. The cardiac monitor was ordered secondary to the patient's chest pain and to monitor for patient for dysrhythmia. CPT [id number]           Medical Decision Making:     I, Dr. Eboni Brooks, am the primary provider of record    80-year-old female presenting with confusion and neurologic deficits. It appears that family and EMS felt that patient may be somewhat aphasic and confused. This improved in route.   She arrives hypertensive, initial NIH stroke scale of 6 with some confusion and difficulty obeying commands, gaze deviation to the left that is overcome but apparently has a bilateral hemianopsia that appears to be left medial field and right temporal field. Stroke alert was initiated and patient was sent for imaging. EKG shows no signs of ischemia or injury. Bedside glucose was acceptable. Patient has known carotid artery disease. She is not a TPA candidate as she is currently taking Eliquis. CT imaging reveals a small stroke in the right temporoparietal region, known carotid occlusions. There does not appear to be any evidence of large vessel occlusion. Spoke with Dr. Antoine Rodriguez from radiology who agrees that there is no intervention needed for LVO. Telemetry stroke was then contacted and spoke with Dr. Fredo Garcia. Tele-stroke was brought into the room for evaluation as well. Stroke neurology agrees that patient is not a TPA candidate, recommend medical admission and further imaging with MRI. They also recommended switching to Xarelto from Eliquis. This was relayed to the admitting physician. Patient remained hemodynamically stable, is hypertensive but neurology feels that this is advantageous at this time. Patient had no improvement in neurologic status prior to transfer. Re-Evaluations:       [phone] continued neurologic deficit with NIH stroke scale 6, no new deficits, no improvement in deficits      This patient's ED course included: a personal history and physicial examination, re-evaluation prior to disposition, multiple bedside re-evaluations, cardiac monitoring, continuous pulse oximetry and complex medical decision making and emergency management    This patient has remained hemodynamically stable and been closely monitored during their ED course. Consultations:  Stroke neurology    Agree that patient is not a TPA candidate, no LVO        Counseling:    The emergency provider has spoken with the patient and family member patient and daughter and discussed todays results, in addition to providing specific details for the plan of care and counseling regarding the diagnosis and prognosis. Questions are answered at this time and they are agreeable with the plan. Please note that the withdrawal or failure to initiate urgent interventions for this patient would likely result in a life threatening deterioration or permanent disability. Accordingly this patient received 40 minutes of critical care time, excluding separately billable procedures. --------------------------------- IMPRESSION AND DISPOSITION ---------------------------------    IMPRESSION  1. Cerebrovascular accident (CVA), unspecified mechanism ([facility] [address]    2. Bilateral hemianopia        DISPOSITION  Disposition: Admit to telemetry  Patient condition is stable        NOTE: This report was transcribed using voice recognition software.  Every effort was made to ensure accuracy; however, inadvertent computerized transcription errors may be present       Donnell Harvey DO  04/02/20 1259

## 2020-04-02 NOTE — ED NOTES
Spoke to Dr Hubert Duverney Neurologist from [facility]. He evaluated pt and updated Daughter thru telestroke via phone. Daughter is ok with plan of care and understands pt will be admitted and further monitored will continue to monitor no further orders per Dr Hubert Duverney at this time.        Georgina Zamora RN  04/02/20 6689

## 2020-04-02 NOTE — PROGRESS NOTES
Physical Therapy    Physical Therapy Initial Assessment     Name: Shelia Floyd  : 10/26/1931  MRN: [id number]    Referring Provider:  Eri Zavala DO    Date of Service: 2020    Evaluating PT:  Meghan Zuniga PT, DPT [id number]      Room #:  2642/6362-H  Diagnosis:  Acute CVA  PMHx/PSHx:  HLD, HTN, L carotid stenosis, R carotid occlusion  Procedure/Surgery:  None this admission  Precautions:  Fall risk, cognition, bed alarm, TSM  Equipment Needs: To be determined    SUBJECTIVE:    Pt is a very poor historian, she states she lives with her  but is unable to recall if home is single floor set up or 2 floor set up. Per previous admission chart review: pt lives with daughter in single story home and amb without use of AD. OBJECTIVE:   Initial Evaluation  Date: 20 Treatment Short Term/ Long Term   Goals   AM-PAC 6 Clicks 60/77     Was pt agreeable to Eval/treatment? yes     Does pt have pain?  No c/o pain     Bed Mobility  Rolling: NT  Supine to sit: Mod A  Sit to supine: Max A x2  Scooting: Min A towards EOB  Rolling: Supervision  Supine to sit: Supervision  Sit to supine: Supervision  Scooting: Supervision   Transfers Sit to stand: Min<>Mod A  Stand to sit: Min A  Stand pivot: NT  Sit to stand: Supervision  Stand to sit: Supervision  Stand pivot: Supervision   Ambulation    12 feet x2 with B HHA with Min Ax2 vs Mod A x2  >50 feet with AAD vs no AD with Supervision   Stair negotiation: ascended and descended  NT  TBD once home set up clarified   ROM BUE:  WNL  BLE:  WNL     Strength BUE:  WNL  BLE:  Mild functional weakness, unable to follow cues for MMT     Balance Sitting EOB:  Min A  Dynamic Standing:  Min A x2 vs Mod A x2  Sitting EOB:  Supervision  Dynamic Standing:  Supervision     Pt is A & O x 1 (self)  Sensation:  Unable to assess due to cognition  Edema: mild edema in B lower legs and feet    Patient education  Pt educated on role of therapy, safety with mobility    Patient response to

## 2020-04-02 NOTE — ED NOTES
Bed: 16  Expected date:   Expected time:   Means of arrival:   Comments:  pushpa Eastman RN  04/02/20 5118

## 2020-04-02 NOTE — CONSULTS
member state that they would still be interested in having the patient remain full code at this point in time. They are interested in having her come home. Neurology was consulted for stroke    Past Medical History:     Past Medical History:   Diagnosis Date    Hyperlipidemia     Hypertension     Left carotid stenosis 3/24/2020    Right carotid artery occlusion 3/24/2020       Past Surgical History:     Past Surgical History:   Procedure Laterality Date    HYSTERECTOMY         Allergies:     Patient has no known allergies. Medications:     Prior to Admission medications    Medication Sig Start Date End Date Taking? Authorizing Provider   apixaban (ELIQUIS) 5 MG TABS tablet Take 5 mg by mouth 2 times daily   Yes Historical Provider, MD   atorvastatin (LIPITOR) 40 MG tablet Take 1 tablet by mouth daily 3/27/20  Yes Barry Raymond DO   losartan (COZAAR) 50 MG tablet Take 1 tablet by mouth daily 3/27/20  Yes Nevaeh Kumar,    metoprolol tartrate (LOPRESSOR) 50 MG tablet Take 50 mg by mouth 2 times daily   Yes Historical Provider, MD   risperiDONE (RISPERDAL) 1 MG tablet Take 1 mg by mouth 2 times daily as needed    Yes Historical Provider, MD   verapamil (CALAN SR) 240 MG CR tablet Take 240 mg by mouth nightly. Yes Historical Provider, MD   Multiple Vitamin (MULTI-VITAMIN DAILY) TABS Take 1 tablet by mouth daily     Historical Provider, MD       Social History:     Patient cannot answer questions for the author    Review of Systems:     Unable to assess due to patient being unable to answer questions for the author    Family History:     No family history on file.      History of Present Illness:         Please see above        Objective:     BP (!) 168/52   Pulse 98   Temp 96.1 °F (35.6 °C) (Temporal)   Resp 22   Wt 196 lb (88.9 kg)   SpO2 98%   BMI 37.03 kg/m²     General appearance: alert, appears stated age, no distress, uncooperative with questions or physical examination  Head: normocephalic, without obvious abnormality, atraumatic  Eyes: conjunctivae/corneas clear.  .  Neck: no adenopathy, no carotid bruit, supple, symmetrical, trachea midline and thyroid not enlarged, symmetric, no tenderness/mass/nodules  Lungs: clear to auscultation bilaterally  Heart: regular rate and rhythm, S1, S2 normal, no click, rub or gallop 3/6 in intensity holosystolic murmur at second intercostal space on the right-hand side  Extremities: normal, atraumatic, no cyanosis or edema  Pulses: 2+ and symmetric  Skin: color, texture, turgor normal---no rashes or lesions    Mental Status: Alert but oriented only to person and cannot answer other questions about time place or situation 9 patient's fundus of knowledge is not intact, patient is unable to repeat what author states, patient's membranes are not intact, patient has poor attention and concentration    Speech: no dysarthria  Language: Unable to assess aphasias due to reduced quality of patient's knowledge dementia    Cranial Nerves:  I: smell    II: visual acuity     II: visual fields Full    II: pupils LISA   III,VII: ptosis None   III,IV,VI: extraocular muscles  EOMI without nystagmus    V: mastication Normal   V: facial light touch sensation  Normal   V,VII: corneal reflex  Present   VII: facial muscle function - upper  Normal   VII: facial muscle function - lower Normal   VIII: hearing Normal   IX: soft palate elevation  Normal   IX,X: gag reflex Present   XI: trapezius strength     XI: sternocleidomastoid strength    XI: neck extension strength     XII: tongue strength  Normal     Motor:  4/5 throughout  Reduced bulk and tone    Sensory:  Could not assess due to patient being uncooperative with physical examination    Coordination:   Could not assess due to patient being uncooperative with physical examination    Gait:   Could not assess due to patient being uncooperative with physical examination    DTR  Could not assess due to patient being

## 2020-04-03 PROCEDURE — 2060000000 HC ICU INTERMEDIATE R&B

## 2020-04-03 PROCEDURE — 97530 THERAPEUTIC ACTIVITIES: CPT | Performed by: PHYSICAL THERAPIST

## 2020-04-03 PROCEDURE — 97116 GAIT TRAINING THERAPY: CPT | Performed by: PHYSICAL THERAPIST

## 2020-04-03 PROCEDURE — 97530 THERAPEUTIC ACTIVITIES: CPT

## 2020-04-03 PROCEDURE — 97535 SELF CARE MNGMENT TRAINING: CPT

## 2020-04-03 PROCEDURE — 99232 SBSQ HOSP IP/OBS MODERATE 35: CPT | Performed by: NURSE PRACTITIONER

## 2020-04-03 PROCEDURE — 2580000003 HC RX 258: Performed by: INTERNAL MEDICINE

## 2020-04-03 PROCEDURE — 6370000000 HC RX 637 (ALT 250 FOR IP): Performed by: INTERNAL MEDICINE

## 2020-04-03 RX ADMIN — SODIUM CHLORIDE, PRESERVATIVE FREE 10 ML: 5 INJECTION INTRAVENOUS at 09:02

## 2020-04-03 RX ADMIN — SODIUM CHLORIDE, PRESERVATIVE FREE 10 ML: 5 INJECTION INTRAVENOUS at 21:02

## 2020-04-03 RX ADMIN — ASPIRIN 81 MG 81 MG: 81 TABLET ORAL at 09:02

## 2020-04-03 RX ADMIN — APIXABAN 5 MG: 5 TABLET, FILM COATED ORAL at 09:02

## 2020-04-03 RX ADMIN — APIXABAN 5 MG: 5 TABLET, FILM COATED ORAL at 21:01

## 2020-04-03 RX ADMIN — DESMOPRESSIN ACETATE 40 MG: 0.2 TABLET ORAL at 21:01

## 2020-04-03 ASSESSMENT — PAIN SCALES - GENERAL
PAINLEVEL_OUTOF10: 0

## 2020-04-03 NOTE — PROGRESS NOTES
OT BEDSIDE TREATMENT NOTE      Date:4/3/2020  Patient Name: Safia Laws  MRN: [id number]  : 10/26/1931  Room: [address] [place], [place] [address]       Per OT Eval:     Evaluating OT: DELORES Waterman, OTR/L [id number]     AM-PAC Daily Activity Raw Score:   Recommended Adaptive Equipment: TBD      Modified Al Scale (MRS)  Score     Description  0             No symptoms  1             No significant disability despite symptoms  2             Slight disability; able to look after own affairs  3             Moderate disability; able to ambulate without assist/ requires assist with ADLs  4             Moderate/Severe disability;requires assist to ambulate/assist with ADLs  5             Severe disability;bedridden/incontinent   6               Score: 4     Diagnosis: CVA   Referring Practitioner: Addison Perez DO  Surgery: n/a   Pertinent Medical History: dementia, PE HTN, R ICA occlusion, L carotid stenosis   Precautions:  Falls, impulsivity/agitation, TSM     Home Living: Pt presents as a poor historian. She reports she lives with her  in a ? with ? step(s) to enter and ? rail(s); bed/bath on ? Bathroom setup: ?   Equipment owned: ?  Prior Level of Function: IND? with ADLs/IADLs; using no AD? for functional mobility   Driving: yes?     Pain Level: 0/10  Cognition: A&O: 2/4 (self, place with cues provided); Follows 1 step directions, with repetition and increased time              Memory:  poor              Sequencing:  poor              Problem solving:  poor              Judgement/safety:  poor                Functional Assessment:    Initial Eval Status  Date: 2020 Treatment Status  Date: 4/3/20 Short Term Goals  Treatment frequency: 2-5x/wk   Feeding Max A (pt able to hold cup and drink but then would required vc's)  Max A  Pt able to hold cup and drink with light assist  Pt unable to feed self with utensils. She removed food from utensils and ate with her fingers.  Poor appetite/VC for improved food consumption  Min A   Grooming Dep (due to difficulty sequencing)   Dep  Pt refused to hold comb and allow hand over hand assist.  Mod A   UB Dressing Max A   Max A with Hand over hand assist  To do/[facility] hospital gown, Pt unable to initiate, sequence, or problem solve  Mod A   LB Dressing Dep (assist with shoes)  dep to moustapha/doff slippers Mod A    Bathing dep  dep  To bathe face, UE and LE Mod A    Toileting dep  max A  Overall for hygiene and clothing management. Therapist ambulated pt to commode with mod A hand held assist Mod A   Bed Mobility  Log roll: Mod A  Supine to sit: Mod A   Sit to supine: Max x 2 (due to re-direction to task)  Log roll: Mod A  Supine to sit: Mod A   Sit to supine: Max x 2 (due to confusion and poor problem solving)  Log roll SUP  Supine to sit: SUP   Sit to supine: SUP   Functional Transfers Sit to stand:Min A (5x during session)   Stand to sit:Min A  Commode: Min A (simulated) Sit to stand:Min A (8x during session)   Stand to sit:Min A  Commode: Min A   Pt high risk for fall d/t poor judgement of location of bed or chair prior to sitting down  SUP   Functional Mobility Mod x 2 (no AD, hand held assist, to from door 2x, did not attempt bathroom due to pt being easily agitated) Mod A Hand held assist with CGA of 2nd person for safety. SUP   Balance Sitting:     Static:  SUP    Dynamic:Min A  Standing: Mod A  Sitting:     Static:  SUP    Dynamic:Min A  Standing: Mod A     Activity Tolerance Poor+ (vc's for sequencing all tasks)  poor+   Sitting EOB x 30 minutes to eat and perform UE ADLs     Visual/  Perceptual Glasses: yes?     Unable to assess vision due to difficulty following commands                      Comments: Upon arrival pt in bed. . Pt educated/facilitated participation in  ADL (feeding, grooming, bathing, dressing activity), functional transfers, functional mobility, bed mobility as noted above.   At end of session in bed, alarm set,  all lines and tubes intact, call light within reach. · Pt has made poor+ progress towards set goals.    · Continue with current plan of care      Treatment Time EH:6440           Treatment Time Out: 3617            Treatment Charges: Mins Units   Ther Ex  29654     Manual Therapy Mirtha Mcdonald [id number] [id number]   ADL/Home Mgt [id number] [phone]   Neuro Re-ed [id number]     Group Therapy      Orthotic manage/training  45158     Non-Billable Time     Total Timed Treatment [address] [place], [place] [id number]

## 2020-04-03 NOTE — PROGRESS NOTES
Hospital Medicine    Subjective:  Pt seen this am alert confused weak      Current Facility-Administered Medications:     apixaban (ELIQUIS) tablet 5 mg, 5 mg, Oral, BID, Rossy Kumar, DO, 5 mg at 04/03/20 0902    aspirin chewable tablet 81 mg, 81 mg, Oral, Daily, Rossy Kumar, DO, 81 mg at 04/03/20 0902    atorvastatin (LIPITOR) tablet 40 mg, 40 mg, Oral, Daily, Rossy Kumar, DO, 40 mg at 04/02/20 1940    [Held by provider] losartan (COZAAR) tablet 50 mg, 50 mg, Oral, Daily, Nishi Edmondson, DO    [Held by provider] metoprolol tartrate (LOPRESSOR) tablet 50 mg, 50 mg, Oral, BID, Nishi Edmondson, DO    [Held by provider] verapamil (CALAN SR) extended release tablet 240 mg, 240 mg, Oral, Nightly, Rossy Kumar, DO    sodium chloride flush 0.9 % injection 10 mL, 10 mL, Intravenous, 2 times per day, Nishi Edmondson, DO, 10 mL at 04/03/20 0902    sodium chloride flush 0.9 % injection 10 mL, 10 mL, Intravenous, PRN, Rossy Kumar, DO    acetaminophen (TYLENOL) tablet 650 mg, 650 mg, Oral, Q6H PRN **OR** acetaminophen (TYLENOL) suppository 650 mg, 650 mg, Rectal, Q6H PRN, Rossy Kumar, DO    polyethylene glycol (GLYCOLAX) packet 17 g, 17 g, Oral, Daily PRN, Rossy Kumar, DO    promethazine (PHENERGAN) tablet 12.5 mg, 12.5 mg, Oral, Q6H PRN **OR** ondansetron (ZOFRAN) injection 4 mg, 4 mg, Intravenous, Q6H PRN, Rossy Kumar, DO    hydrALAZINE (APRESOLINE) injection 10 mg, 10 mg, Intravenous, Q6H PRN, Mervat Carrizlaes,     labetalol (NORMODYNE;TRANDATE) injection 10 mg, 10 mg, Intravenous, Q6H PRN, Mervat Coy Jr., DO    Objective:    BP (!) 162/90   Pulse 88   Temp 97.3 °F (36.3 °C) (Temporal)   Resp 16   Ht 5' 1\" (1.549 m)   Wt 196 lb (88.9 kg)   SpO2 98%   BMI 37.03 kg/m²     Heart:  Reg  Lungs:  CTA bilaterally, no wheeze, rales or rhonchi  Abd: bowel sounds present, nontender, nondistended, no masses  Extrem:  Edema legs    CBC with Differential:    Lab

## 2020-04-03 NOTE — PROGRESS NOTES
Physical Therapy  Facility/Department: [address] NEURO IMCU  Daily Treatment Note  NAME: Smitha Olivas  : 10/26/1931  MRN: [id number]    Date of Service: 4/3/2020    Evaluating PT:  Hailey Dorantes PT, DPT [id number]        Room #:  0210/1476-L  Diagnosis:  Acute CVA  PMHx/PSHx:  HLD, HTN, L carotid stenosis, R carotid occlusion  Procedure/Surgery:  None this admission  Precautions:  Fall risk, cognition, bed alarm, TSM  Equipment Needs: To be determined     SUBJECTIVE:     Pt is a very poor historian, she states she lives with her  but is unable to recall if home is single floor set up or 2 floor set up. Per previous admission chart review: pt lives with daughter in single story home and amb without use of AD.      OBJECTIVE:    Initial Evaluation  Date: 20 Treatment  4/3/20 Short Term/ Long Term   Goals   AM-PAC 6 Clicks 95/42       Was pt agreeable to Eval/treatment? yes  yes     Does pt have pain? No c/o pain  no c/o pain     Bed Mobility  Rolling: NT  Supine to sit: Mod A  Sit to supine: Max A x2  Scooting: Min A towards EOB  Rolling: NT  Supine to sit: Mod A  Sit to supine: Max A x2  Scooting towards EOB: Min A Rolling: Supervision  Supine to sit: Supervision  Sit to supine: Supervision  Scooting: Supervision   Transfers Sit to stand: Min<>Mod A  Stand to sit: Min A  Stand pivot: NT  Sit to stand: Min A  Stand to sit: Min A  Stand pivot: NT Sit to stand: Supervision  Stand to sit: Supervision  Stand pivot: Supervision   Ambulation    12 feet x2 with B HHA with Min Ax2 vs Mod A x2  10 feet x2, 15 feet x2 with B HA Mod A +SBA for safety  >50 feet with AAD vs no AD with Supervision   Stair negotiation: ascended and descended  NT NT  TBD once home set up clarified   ROM BUE:  WNL  BLE:  WNL       Strength BUE:  WNL  BLE:  Mild functional weakness, unable to follow cues for MMT       Balance Sitting EOB:  Min A  Dynamic Standing:  Min A x2 vs Mod A x2  Sitting EOB: SBA<>Min A  Dynamic standing:  Mod A with B HHA Sitting EOB:  Supervision  Dynamic Standing:  Supervision      Pt is A & O x 1, following cues <50% of time  Sensation:  Pt denies numbness and tingling to extremities  Edema:  Mild edema to B lower legs and feet    Therapeutic Exercises:  Attempted, pt states \"no, I can't\" when attempting to complete. Pt unable to follow cues due to cognition. Patient education  Pt educated on role of therapy, safety with mobility and transfers    Patient response to education:   Pt verbalized understanding Pt demonstrated skill Pt requires further education in this area   yes With assistance yes     ASSESSMENT:    Comments:  Pt resting supine upon arrival, agreeable to PT session with OT collaboration. Pt becoming agitated with therapist attempts to cue for improved safety with transfers, reaching hands for therapist to pull self to standing and does not reach back during lowering. Pt attempting to stand multiple times during cues to initiate grooming with OT, requiring ongoing verbal/tactile cues to remain seated on EOB. Pt completed amb to restroom with B ILIANAA and additional therapist as SBA due to pt attempting multiple times to be seated prior to reaching toilet or EOB with amb to/from restroom. She was able to amb to/from doorway of room again with B ILIANAA. Pt demonstrates wide RUBY with short, shuffling steps without full clearance of either foot to progress. Pt maintaining sitting on EOB x30 minutes throughout session without UE support at SBA level, intermittent Min A to prevent standing for pt safety. Pt was able to demonstrate small weight shifts reaching 3-4\" outside RUBY to obtain objects on tray table and leaning backwards to drink out of juice. Pt returned to supine with assistance x2 as pt resisting transition and becoming rigid through trunk and B LEs. All needs in reach, fall precautions in place, TSM intact. RN notified of pt positioning and performance.     Treatment:  Patient practiced and was instructed in the following treatment:     Therapeutic activities: sitting EOB x30 minutes to complete functional activities and reaching small distances outside RUBY, transfer training with attempts for improved hand placement during sit<>stand transfers, ongoing education to attempt gentle reorientation to place and task at hand, ongoing education for safety with all mobility.  Gait training: amb with B HHA, cues for improved step length and upright posture. Verbal/tactile cues required for direction and to avoid premature sitting during amb prior to reaching surface to be seated. PLAN:    Patient is making slow progress towards established goals. Will continue with current POC.         PLAN:    Time in  0925  Time out  0918    Total Treatment Time  53    CPT codes:  [x] Gait training 04079 12 minutes  [] Manual therapy 92323 0 minutes  [x] Therapeutic activities 63155 41 minutes  [] Therapeutic exercises 84488 0 minutes  [] Neuromuscular reeducation 08925 0 minutes      Hailey Dorantes PT, DPT  [id number]

## 2020-04-03 NOTE — PLAN OF CARE
Problem: HH COMMUNICATION PROBLEMS-CVA  Goal: Self-care - activities of daily living  Description: Ability to perform basic physical tasks and personal care activities.      Outcome: Met This Shift     Problem: ACTIVITY INTOLERANCE/IMPAIRED MOBILITY  Goal: Mobility/activity is maintained at optimum level for patient  Outcome: Met This Shift     Problem: COMMUNICATION IMPAIRMENT  Goal: Ability to express needs and understand communication  Outcome: Met This Shift     Problem: Falls - Risk of:  Goal: Will remain free from falls  Description: Will remain free from falls  Outcome: Met This Shift  Goal: Absence of physical injury  Description: Absence of physical injury  Outcome: Met This Shift

## 2020-04-03 NOTE — CARE COORDINATION
4/3 Shawna's daughter is going to get back in touch with [facility] after further discussion with family. Pes Joselyn she is to call her back today at 4 pm today. Continue to treat till daughter decides to go home with [facility].  Elsa Alvarez RN CM

## 2020-04-03 NOTE — PROGRESS NOTES
thumbs up on the right    Decent attention/concentration  Impaired fundus of knowledge  Impaired memories    Speech: no dysarthria  Language: no aphasias    Cranial Nerves:  I: smell NA   II: visual acuity  NA   II: visual fields  blinks to threat   II: pupils PERRL   III,VII: ptosis None   III,IV,VI: extraocular muscles   will track examiner   V: mastication Normal   V: facial light touch sensation  Normal   V,VII: corneal reflex     VII: facial muscle function - upper  Normal   VII: facial muscle function - lower ?   Right lower facial droop   VIII: hearing Normal   IX: soft palate elevation  Normal   IX,X: gag reflex    XI: trapezius strength     XI: sternocleidomastoid strength    XI: neck extension strength     XII: tongue strength  Normal     Motor:  Moves all to noxious stimuli  Normal bulk and tone  No abnormal movements    Sensory:  Moves all to noxious stimuli    Coordination:   Unable to complete due to patient's inability to follow directions    Gait:  Unable to complete for patient safety    DTR:   Right Brachioradialis reflex 1+  Left Brachioradialis reflex 1+  Right Biceps reflex 1+  Left Biceps reflex 1+  Right Triceps reflex 1+  Left Triceps reflex 1+  Right Quadriceps reflex 1+  Left Quadriceps reflex 1+  Right Achilles reflex 0  Left Achilles reflex 0    No Babinskis  No Renteria's    No pathological reflexes    Laboratory/Radiology:     CBC:   Lab Results   Component Value Date    WBC 9.6 04/02/2020    RBC 4.25 04/02/2020    HGB 13.4 04/02/2020    HCT 41.4 04/02/2020    MCV 97.4 04/02/2020    MCH 31.5 04/02/2020    MCHC 32.4 04/02/2020    RDW 12.8 04/02/2020     04/02/2020    MPV 10.4 04/02/2020     CMP:    Lab Results   Component Value Date     04/02/2020    K 4.6 04/02/2020     04/02/2020    CO2 28 04/02/2020    BUN 15 04/02/2020    CREATININE 0.8 04/02/2020    GFRAA >60 04/02/2020    LABGLOM >60 04/02/2020    GLUCOSE 154 04/02/2020    PROT 6.7 04/02/2020    LABALBU 3.9 04/02/2020    CALCIUM 9.8 04/02/2020    BILITOT 0.6 04/02/2020    ALKPHOS 78 04/02/2020    AST 20 04/02/2020    ALT 13 04/02/2020     Hepatic Function Panel:    Lab Results   Component Value Date    ALKPHOS 78 04/02/2020    ALT 13 04/02/2020    AST 20 04/02/2020    PROT 6.7 04/02/2020    BILITOT 0.6 04/02/2020    LABALBU 3.9 04/02/2020     U/A:    Lab Results   Component Value Date    COLORU Yellow 03/24/2020    PROTEINU Negative 03/24/2020    PHUR 7.5 03/24/2020    WBCUA 0-1 03/24/2020    RBCUA 0-1 03/24/2020    BACTERIA NONE SEEN 03/24/2020    CLARITYU Clear 03/24/2020    SPECGRAV 1.015 03/24/2020    LEUKOCYTESUR TRACE 03/24/2020    UROBILINOGEN 0.2 03/24/2020    BILIRUBINUR Negative 03/24/2020    BLOODU TRACE-INTACT 03/24/2020    GLUCOSEU Negative 03/24/2020     HgBA1c:    Lab Results   Component Value Date    LABA1C 5.3 04/02/2020     FLP:    Lab Results   Component Value Date    TRIG 151 04/02/2020    HDL 39 04/02/2020    LDLCALC 70 04/02/2020    LABVLDL 30 04/02/2020     XR CHEST PORTABLE   Final Result   Streaky bibasilar opacities. Differential includes atelectasis and   infection. CTA Head W Contrast   Final Result   1. Moderate atherosclerotic disease . Occluded right carotid ARTERY. Technically suboptimal study   2. Estimated stenosis by NASCET criteria is greater than 70% on the   left               CTA Neck W Contrast   Final Result   1. Moderate atherosclerotic disease . Occluded right carotid ARTERY. Technically suboptimal study   2. Estimated stenosis by NASCET criteria is greater than 70% on the   left               CT BRAIN PERFUSION   Final Result      No significant ischemic penumbra identified   Perfusion delay on the right involving the right hemisphere      ALERT:  THIS IS AN ABNORMAL REPORT            CT Head WO Contrast   Final Result   No acute intracranial hemorrhage or mass effect.    Findings compatible with extensive chronic

## 2020-04-03 NOTE — PROGRESS NOTES
Liaison Informational Visit Note      Referral received from [facility]      Patient Name: Safia Laws   :  10/26/1931  MRN:  [id number]    Admit date:  2020      Hospital Admitting Physician:  Addison Perez DO   PCP:  Mayo Cruz MD      Primary Insurance: Payor: Hector Luciano /  /  /    Secondary Insurance:  unknown    Emergency Contact:      Contact/Relation:   /         Phone:       Contact/Relation:   /     Phone:     Advance Directive  Advance directives received No  Patient has a documented healthcare surrogate  Discussed with: Family member  DPOA-HC Name-Relation:    Phone:       Terminal Diagnosis ES Vascular Disease as confirmed by Dr. Tabatha Lyles, [facility] Problem List:   Patient Active Problem List   Diagnosis Code    Pulmonary embolism, bilateral ([address] I26.99    Dementia ([address] F03.90    Hypertension I10    Acute saddle pulmonary embolism with acute cor pulmonale ([address] I26.02    Right carotid artery occlusion I65.21    Left carotid stenosis I65.22    Acute pulmonary embolism without acute cor pulmonale (HCC) I26.99    Bilateral pulmonary embolism (HCC) I26.99    Acute CVA (cerebrovascular accident) ([address] I63.9    Hyperlipidemia E78.5    Chronic anticoagulation Z79.01    Acute delirium R41.0       Code Status Order: Full Code     Past Medical History:        Diagnosis Date    Hyperlipidemia     Hypertension     Left carotid stenosis 3/24/2020    Right carotid artery occlusion 3/24/2020     Past Surgical History:        Procedure Laterality Date    HYSTERECTOMY         Allergies:  Patient has no known allergies. Family Goal: Undecided at this time    Meeting held with Spoke with daughter Nohemy and XU over the phone. Referral received. Chart reviewed. Had a lengthy conversation with patient's daughter and XU over the phone. Explained hospice philosophy and no longer pursuing any further aggressive treatment. Focusing on comfort care only.  Explained

## 2020-04-04 VITALS
TEMPERATURE: 98.7 F | BODY MASS INDEX: 37 KG/M2 | WEIGHT: 196 LBS | HEIGHT: 61 IN | DIASTOLIC BLOOD PRESSURE: 92 MMHG | SYSTOLIC BLOOD PRESSURE: 154 MMHG | RESPIRATION RATE: 16 BRPM | HEART RATE: 78 BPM | OXYGEN SATURATION: 94 %

## 2020-04-04 PROCEDURE — 6370000000 HC RX 637 (ALT 250 FOR IP): Performed by: INTERNAL MEDICINE

## 2020-04-04 PROCEDURE — 2580000003 HC RX 258: Performed by: INTERNAL MEDICINE

## 2020-04-04 RX ADMIN — ASPIRIN 81 MG 81 MG: 81 TABLET ORAL at 08:49

## 2020-04-04 RX ADMIN — SODIUM CHLORIDE, PRESERVATIVE FREE 10 ML: 5 INJECTION INTRAVENOUS at 08:49

## 2020-04-04 RX ADMIN — APIXABAN 5 MG: 5 TABLET, FILM COATED ORAL at 08:49

## 2020-04-04 ASSESSMENT — PAIN SCALES - GENERAL
PAINLEVEL_OUTOF10: 0
PAINLEVEL_OUTOF10: 0

## 2020-04-04 NOTE — PLAN OF CARE
Problem: HEMODYNAMIC STATUS  Goal: Patient has stable vital signs and fluid balance  Outcome: Met This Shift     Problem: ACTIVITY INTOLERANCE/IMPAIRED MOBILITY  Goal: Mobility/activity is maintained at optimum level for patient  Outcome: Met This Shift     Problem: COMMUNICATION IMPAIRMENT  Goal: Ability to express needs and understand communication  Outcome: Met This Shift     Problem: Falls - Risk of:  Goal: Will remain free from falls  Outcome: Met This Shift

## 2020-04-04 NOTE — DISCHARGE SUMMARY
infection. Cta Neck W Contrast    Result Date: 2020  Patient MRN:  [id number] : 10/26/1931 Age: 80 years Gender: Female Order Date:  2020 12:30 PM EXAM: CTA NECK W CONTRAST, CTA HEAD W CONTRAST NUMBER OF IMAGES:  1047 INDICATION:  stroke stroke COMPARISON: None Technique: Low-dose CT  acquisition technique included one of following options; 1 . Automated exposure control, 2. Adjustment of MA and or KV according to patient's size or 3. Use of iterative reconstruction. Contiguous spiral images were obtained in the axial plane, following the administration of intravenous contrast using CT angiographic protocol. Sagittal and coronal images were reconstructed from the axial plane acquisition. Additional MIP reconstructions were presented to aid in the interpretation of this study. Images were obtained from the skull base cranially. This study is technically suboptimal There is severe calcified plaque identified in the vessels compatible with atherosclerotic disease. There is aortic arch and great vessels demonstrate mild atherosclerotic disease. The right carotid is occluded The left carotid is abnormal. There is  evidence for hemodynamically significant stenosis at the level the proximal internal carotid artery. By NASCET criteria estimated stenosis is 70% or greater The right vertebral artery is unremarkable. The left vertebral artery is unremarkable. The basilar artery is unremarkable. The middle cerebral arteries are unremarkable. The anterior cerebral arteries are unremarkable. The posterior cerebral arteries are unremarkable. 1.  Moderate atherosclerotic disease . Occluded right carotid ARTERY. Technically suboptimal study 2.  Estimated stenosis by NASCET criteria is greater than 70% on the left     Ct Brain Perfusion    Result Date: 2020  Patient MRN: [id number] : 10/26/1931 Age:  80 years Gender: Female Order Date: 2020 12:30 PM Exam: CT BRAIN PERFUSION Number of Images: 329 views

## 2020-07-06 ENCOUNTER — TELEPHONE (OUTPATIENT)
Dept: VASCULAR SURGERY | Age: 85
End: 2020-07-06

## 2020-07-06 NOTE — TELEPHONE ENCOUNTER
----- Message from Katerin Mcwilliams MD sent at 7/6/2020 11:28 AM EDT -----  In March, I told her and her daughter, to see me in 6 months, looks like then our call for appointment    Her daughter's name is Pedro Brock, telephone #[phone]    Please call the patient and see if he can give her an appointment see me in October, to monitor the carotid artery disease

## 2024-06-30 NOTE — H&P
0811- Patient required an increased oxygen demand, provider notified and orders placed.    Patient continued to have shortness of breath, xray ordered.  1.7 L of fluid removed from L lung. Patient transported to TICU, report given to [place].     polyp, or discharge noted. Mouth:   Mucosa without lesion. Pharynx:  Noninjected without exudate. NECK:  Supple. No JVD. No thyromegaly. No carotid bruits. HEART:  Regular rate and rhythm without murmur. LUNGS:  Clear to auscultation bilaterally. ABDOMEN:  Positive bowel sounds, soft, nontender. No rebound or  guarding. No hepatosplenomegaly. No masses. BACK:  With increased thoracic kyphosis. EXTREMITIES:  1+ edema in the bilateral lower extremities. LYMPH NODES:  No adenopathy noted. SKIN:  Without rash or lesion. IMPRESSION:  Bilateral pulmonary emboli, right carotid artery thrombus,  left internal carotid artery stenosis, dementia, hypertension, status  post fall. PLAN:  Admit, anticoagulate, Vascular and Pulmonary to see, blood  pressure control. DISCHARGE PLAN:  Home when stable.         Deisy La DO    D: 03/24/2020 9:17:25       T: 03/24/2020 9:22:19     [id number]  Job#: [id number]     Doc#: [id number]    CC: